# Patient Record
Sex: MALE | Race: BLACK OR AFRICAN AMERICAN | NOT HISPANIC OR LATINO | URBAN - METROPOLITAN AREA
[De-identification: names, ages, dates, MRNs, and addresses within clinical notes are randomized per-mention and may not be internally consistent; named-entity substitution may affect disease eponyms.]

---

## 2021-05-03 DIAGNOSIS — N48.6 PEYRONIE'S DISEASE: Primary | ICD-10-CM

## 2021-05-11 ENCOUNTER — OFFICE VISIT (OUTPATIENT)
Dept: UROLOGY | Facility: CLINIC | Age: 71
End: 2021-05-11
Payer: MEDICARE

## 2021-05-11 VITALS
WEIGHT: 207 LBS | HEIGHT: 77 IN | DIASTOLIC BLOOD PRESSURE: 71 MMHG | RESPIRATION RATE: 16 BRPM | SYSTOLIC BLOOD PRESSURE: 165 MMHG | HEART RATE: 58 BPM | BODY MASS INDEX: 24.44 KG/M2

## 2021-05-11 DIAGNOSIS — N48.6 PEYRONIE'S DISEASE: Primary | ICD-10-CM

## 2021-05-11 PROCEDURE — 1159F MED LIST DOCD IN RCRD: CPT | Mod: S$GLB,,, | Performed by: NURSE PRACTITIONER

## 2021-05-11 PROCEDURE — 3008F PR BODY MASS INDEX (BMI) DOCUMENTED: ICD-10-PCS | Mod: CPTII,S$GLB,, | Performed by: NURSE PRACTITIONER

## 2021-05-11 PROCEDURE — 1126F AMNT PAIN NOTED NONE PRSNT: CPT | Mod: S$GLB,,, | Performed by: NURSE PRACTITIONER

## 2021-05-11 PROCEDURE — 1159F PR MEDICATION LIST DOCUMENTED IN MEDICAL RECORD: ICD-10-PCS | Mod: S$GLB,,, | Performed by: NURSE PRACTITIONER

## 2021-05-11 PROCEDURE — 99204 OFFICE O/P NEW MOD 45 MIN: CPT | Mod: S$GLB,,, | Performed by: NURSE PRACTITIONER

## 2021-05-11 PROCEDURE — 99204 PR OFFICE/OUTPT VISIT, NEW, LEVL IV, 45-59 MIN: ICD-10-PCS | Mod: S$GLB,,, | Performed by: NURSE PRACTITIONER

## 2021-05-11 PROCEDURE — 3008F BODY MASS INDEX DOCD: CPT | Mod: CPTII,S$GLB,, | Performed by: NURSE PRACTITIONER

## 2021-05-11 PROCEDURE — 1126F PR PAIN SEVERITY QUANTIFIED, NO PAIN PRESENT: ICD-10-PCS | Mod: S$GLB,,, | Performed by: NURSE PRACTITIONER

## 2021-05-11 RX ORDER — CLONIDINE HYDROCHLORIDE 0.2 MG/1
0.2 TABLET ORAL 2 TIMES DAILY
COMMUNITY
Start: 2021-04-28

## 2021-05-11 RX ORDER — AMLODIPINE BESYLATE 10 MG/1
10 TABLET ORAL DAILY
COMMUNITY
Start: 2021-04-28

## 2021-05-11 RX ORDER — TRIAMCINOLONE ACETONIDE 1 MG/G
CREAM TOPICAL 2 TIMES DAILY
COMMUNITY

## 2021-05-11 RX ORDER — LISINOPRIL 40 MG/1
40 TABLET ORAL DAILY
COMMUNITY
Start: 2021-04-28

## 2022-04-10 ENCOUNTER — HISTORICAL (OUTPATIENT)
Dept: ADMINISTRATIVE | Facility: HOSPITAL | Age: 72
End: 2022-04-10
Payer: MEDICARE

## 2022-04-26 VITALS
HEIGHT: 77 IN | BODY MASS INDEX: 24.18 KG/M2 | DIASTOLIC BLOOD PRESSURE: 92 MMHG | SYSTOLIC BLOOD PRESSURE: 180 MMHG | WEIGHT: 204.81 LBS

## 2022-06-10 ENCOUNTER — TELEPHONE (OUTPATIENT)
Dept: UROLOGY | Facility: CLINIC | Age: 72
End: 2022-06-10
Payer: MEDICARE

## 2022-06-10 NOTE — TELEPHONE ENCOUNTER
Returned pt's call. Follow appointment was canceled and pt has no upcoming. He states his truck is broke down and cant find anyone to bring him for apt, wants refill of verapamil and if provider agrees let him know so he go pick it up.

## 2022-06-10 NOTE — TELEPHONE ENCOUNTER
----- Message from Jennifer Hyde sent at 6/10/2022  2:25 PM CDT -----  Contact: self  Type:  RX Refill Request    Who Called:  Davi Glaser   Refill or New Rx:  REFILL  RX Name and Strength: verapamil gel    Preferred Pharmacy with phone number:   [facility] - [place], [place] - [address]  [address] [facility] [address]  Phone: [phone] Fax: [phone]     Local or Mail Order: LOCAL  Ordering Provider: RADHA   Would the patient rather a call back or a response via [facility]?  PLEASE CALL BACK   Best Call Back Number: [phone]   Additional Information: N/A

## 2022-06-15 NOTE — TELEPHONE ENCOUNTER
Pt notified appointment needed. Offered one for this afternoon and even merrick. Pt states no to both. Gave next aval.

## 2022-07-21 ENCOUNTER — OFFICE VISIT (OUTPATIENT)
Dept: UROLOGY | Facility: CLINIC | Age: 72
End: 2022-07-21
Payer: MEDICARE

## 2022-07-21 DIAGNOSIS — N48.6 PEYRONIE'S DISEASE: Primary | ICD-10-CM

## 2022-07-21 PROCEDURE — 1160F PR REVIEW ALL MEDS BY PRESCRIBER/CLIN PHARMACIST DOCUMENTED: ICD-10-PCS | Mod: CPTII,S$GLB,, | Performed by: NURSE PRACTITIONER

## 2022-07-21 PROCEDURE — 1160F RVW MEDS BY RX/DR IN RCRD: CPT | Mod: CPTII,S$GLB,, | Performed by: NURSE PRACTITIONER

## 2022-07-21 PROCEDURE — 1159F MED LIST DOCD IN RCRD: CPT | Mod: CPTII,S$GLB,, | Performed by: NURSE PRACTITIONER

## 2022-07-21 PROCEDURE — 99214 OFFICE O/P EST MOD 30 MIN: CPT | Mod: S$GLB,,, | Performed by: NURSE PRACTITIONER

## 2022-07-21 PROCEDURE — 99214 PR OFFICE/OUTPT VISIT, EST, LEVL IV, 30-39 MIN: ICD-10-PCS | Mod: S$GLB,,, | Performed by: NURSE PRACTITIONER

## 2022-07-21 PROCEDURE — 4010F PR ACE/ARB THEARPY RXD/TAKEN: ICD-10-PCS | Mod: CPTII,S$GLB,, | Performed by: NURSE PRACTITIONER

## 2022-07-21 PROCEDURE — 4010F ACE/ARB THERAPY RXD/TAKEN: CPT | Mod: CPTII,S$GLB,, | Performed by: NURSE PRACTITIONER

## 2022-07-21 PROCEDURE — 1159F PR MEDICATION LIST DOCUMENTED IN MEDICAL RECORD: ICD-10-PCS | Mod: CPTII,S$GLB,, | Performed by: NURSE PRACTITIONER

## 2022-11-16 DIAGNOSIS — C49.A4 GIST (GASTROINTESTINAL STROMA TUMOR), MALIGNANT, COLON: Primary | ICD-10-CM

## 2023-01-05 ENCOUNTER — DOCUMENTATION ONLY (OUTPATIENT)
Dept: SURGICAL ONCOLOGY | Facility: CLINIC | Age: 73
End: 2023-01-05

## 2023-01-05 NOTE — PROGRESS NOTES
Patient calls to reschedule appt, he will have a ride next week however Dr. Maravilla is not in.  I offered him an appt, he says he will have to call back to let us know if he will have a ride.

## 2023-05-29 DIAGNOSIS — R14.0 ABDOMINAL DISTENTION: Primary | ICD-10-CM

## 2023-05-29 DIAGNOSIS — R16.0 LIVER MASS: ICD-10-CM

## 2023-05-30 ENCOUNTER — TELEPHONE (OUTPATIENT)
Dept: GASTROENTEROLOGY | Facility: CLINIC | Age: 73
End: 2023-05-30

## 2023-05-30 NOTE — TELEPHONE ENCOUNTER
----- Message from Domitila Colon sent at 5/30/2023 12:26 PM CDT -----  Contact: pt  Pt returing a call from Marleny gan can be reached at [phone].    Thanks,

## 2023-07-13 ENCOUNTER — TELEPHONE (OUTPATIENT)
Dept: GASTROENTEROLOGY | Facility: CLINIC | Age: 73
End: 2023-07-13
Payer: MEDICARE

## 2023-07-13 NOTE — TELEPHONE ENCOUNTER
----- Message from Emily Harris sent at 7/13/2023 12:33 PM CDT -----  Type:  Patient Returning Call    Who Called:Davi Glaser,  Who Left Message for Patient: -  Does the patient know what this is regarding?:scheduling   Would the patient rather a call back or a response via [facility]?    Best Call Back Number:[phone]    Additional Information: returning your call

## 2023-07-14 ENCOUNTER — TELEPHONE (OUTPATIENT)
Dept: HEMATOLOGY/ONCOLOGY | Facility: CLINIC | Age: 73
End: 2023-07-14
Payer: MEDICARE

## 2023-07-14 NOTE — TELEPHONE ENCOUNTER
Spoke to the patient. Appt date, time, and location provided. Requested records from [place] [id number]. [facility]

## 2023-07-24 ENCOUNTER — OFFICE VISIT (OUTPATIENT)
Dept: HEMATOLOGY/ONCOLOGY | Facility: CLINIC | Age: 73
End: 2023-07-24
Payer: MEDICARE

## 2023-07-24 ENCOUNTER — TELEPHONE (OUTPATIENT)
Dept: GASTROENTEROLOGY | Facility: CLINIC | Age: 73
End: 2023-07-24
Payer: MEDICARE

## 2023-07-24 VITALS
RESPIRATION RATE: 16 BRPM | WEIGHT: 223 LBS | SYSTOLIC BLOOD PRESSURE: 149 MMHG | BODY MASS INDEX: 26.33 KG/M2 | HEART RATE: 51 BPM | OXYGEN SATURATION: 98 % | HEIGHT: 77 IN | DIASTOLIC BLOOD PRESSURE: 65 MMHG

## 2023-07-24 DIAGNOSIS — C49.A0 GASTROINTESTINAL STROMAL TUMOR (GIST): Primary | ICD-10-CM

## 2023-07-24 PROCEDURE — 4010F PR ACE/ARB THEARPY RXD/TAKEN: ICD-10-PCS | Mod: CPTII,S$GLB,, | Performed by: INTERNAL MEDICINE

## 2023-07-24 PROCEDURE — 1101F PR PT FALLS ASSESS DOC 0-1 FALLS W/OUT INJ PAST YR: ICD-10-PCS | Mod: CPTII,S$GLB,, | Performed by: INTERNAL MEDICINE

## 2023-07-24 PROCEDURE — 3288F PR FALLS RISK ASSESSMENT DOCUMENTED: ICD-10-PCS | Mod: CPTII,S$GLB,, | Performed by: INTERNAL MEDICINE

## 2023-07-24 PROCEDURE — 99205 PR OFFICE/OUTPT VISIT, NEW, LEVL V, 60-74 MIN: ICD-10-PCS | Mod: S$GLB,,, | Performed by: INTERNAL MEDICINE

## 2023-07-24 PROCEDURE — 3077F SYST BP >= 140 MM HG: CPT | Mod: CPTII,S$GLB,, | Performed by: INTERNAL MEDICINE

## 2023-07-24 PROCEDURE — 3008F PR BODY MASS INDEX (BMI) DOCUMENTED: ICD-10-PCS | Mod: CPTII,S$GLB,, | Performed by: INTERNAL MEDICINE

## 2023-07-24 PROCEDURE — 3078F PR MOST RECENT DIASTOLIC BLOOD PRESSURE < 80 MM HG: ICD-10-PCS | Mod: CPTII,S$GLB,, | Performed by: INTERNAL MEDICINE

## 2023-07-24 PROCEDURE — 99205 OFFICE O/P NEW HI 60 MIN: CPT | Mod: S$GLB,,, | Performed by: INTERNAL MEDICINE

## 2023-07-24 PROCEDURE — 1101F PT FALLS ASSESS-DOCD LE1/YR: CPT | Mod: CPTII,S$GLB,, | Performed by: INTERNAL MEDICINE

## 2023-07-24 PROCEDURE — 3077F PR MOST RECENT SYSTOLIC BLOOD PRESSURE >= 140 MM HG: ICD-10-PCS | Mod: CPTII,S$GLB,, | Performed by: INTERNAL MEDICINE

## 2023-07-24 PROCEDURE — 3008F BODY MASS INDEX DOCD: CPT | Mod: CPTII,S$GLB,, | Performed by: INTERNAL MEDICINE

## 2023-07-24 PROCEDURE — 3288F FALL RISK ASSESSMENT DOCD: CPT | Mod: CPTII,S$GLB,, | Performed by: INTERNAL MEDICINE

## 2023-07-24 PROCEDURE — 1159F MED LIST DOCD IN RCRD: CPT | Mod: CPTII,S$GLB,, | Performed by: INTERNAL MEDICINE

## 2023-07-24 PROCEDURE — 3078F DIAST BP <80 MM HG: CPT | Mod: CPTII,S$GLB,, | Performed by: INTERNAL MEDICINE

## 2023-07-24 PROCEDURE — 1125F PR PAIN SEVERITY QUANTIFIED, PAIN PRESENT: ICD-10-PCS | Mod: CPTII,S$GLB,, | Performed by: INTERNAL MEDICINE

## 2023-07-24 PROCEDURE — 1159F PR MEDICATION LIST DOCUMENTED IN MEDICAL RECORD: ICD-10-PCS | Mod: CPTII,S$GLB,, | Performed by: INTERNAL MEDICINE

## 2023-07-24 PROCEDURE — 4010F ACE/ARB THERAPY RXD/TAKEN: CPT | Mod: CPTII,S$GLB,, | Performed by: INTERNAL MEDICINE

## 2023-07-24 PROCEDURE — 1125F AMNT PAIN NOTED PAIN PRSNT: CPT | Mod: CPTII,S$GLB,, | Performed by: INTERNAL MEDICINE

## 2023-07-24 RX ORDER — CLARITHROMYCIN 500 MG/1
500 TABLET, FILM COATED ORAL 2 TIMES DAILY
COMMUNITY
Start: 2023-05-16

## 2023-07-24 RX ORDER — PANTOPRAZOLE SODIUM 40 MG/1
40 TABLET, DELAYED RELEASE ORAL 2 TIMES DAILY
COMMUNITY
Start: 2023-05-16

## 2023-07-24 RX ORDER — AMOXICILLIN AND CLAVULANATE POTASSIUM 500; 125 MG/1; MG/1
1 TABLET, FILM COATED ORAL 2 TIMES DAILY
COMMUNITY
Start: 2023-05-16

## 2023-07-24 RX ORDER — DICYCLOMINE HYDROCHLORIDE 20 MG/1
TABLET ORAL
COMMUNITY
Start: 2023-06-02

## 2023-07-24 NOTE — TELEPHONE ENCOUNTER
Please review notes from Dr. Short. 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 Would you like to work this patient in? -KG

## 2023-07-24 NOTE — TELEPHONE ENCOUNTER
----- Message from Marleny Banerjee sent at 7/24/2023  4:09 PM CDT -----  Contact: self  Type - New Patient Appointment (Referral)    Patient called in stating Dr. Short sent a referral to get in with Dr. Cat. May I have someone review and reach out to the patient @ [phone] - thanks!

## 2023-07-24 NOTE — PROGRESS NOTES
MEDICAL ONCOLOGY INITIAL CONSULTATION NOTE    Patient ID: Davi Galser is a 72 y.o. male.    Chief Complaint: GIST    HPI:  Patient is a 72-year-old male with past medical history of gastroesophageal reflux disease, hypertension, H pylori infection, hyperlipidemia with past medical history of chronic active gastritis secondary to H pylori infection, reflux esophagitis, tubular adenoma and hyperplastic polyp who recently underwent colonoscopy in November of 2022 which showed a spindle cell lesion with morphologic and immunohistochemical findings consistent with a gastrointestinal stromal tumor along the 2nd portion of the duodenum. Abdominal imaging which included CT scan of the abdomen and pelvis with IV contrast which showed findings as below. I see various cancelled appointments by patient where he was referred to GI and Surg-onc in [place]. He now presents to our clinic today for further evaluation.      Pathology:   08/29/2022    Abdomen/liver:  Negative, no malignant tumor cells identified.  Angioma, benign    Second part of duodenum:   DOG 1 positive   positive    Imaging:   CT scan Abdomen and pelvis with IV contrast: 8/10/22    Impression:   1. 1.8 cm hypervascular duodenal nodule may represent neuroendocrine tumor, GIST or metastatic disease  2. Hypervascular hepatic mass concerning for metastatic disease  3. Colonic diverticulosis without diverticulitis  4. Mild bladder wall thickening  5. Prostatomegaly, 5.2 cm     Past Medical History:   Diagnosis Date    GERD (gastroesophageal reflux disease)     H. pylori infection     HLD (hyperlipidemia)     Hypertension      Family History   Problem Relation Age of Onset    Breast cancer Sister      Social History     Socioeconomic History    Marital status: Unknown   Tobacco Use    Smoking status: Every Day     Packs/day: 0.05     Years: 50.00     Pack years: 2.50     Types: Cigarettes    Smokeless tobacco: Never   Substance and Sexual Activity     Alcohol use: Not Currently    Drug use: Not Currently    Sexual activity: Not Currently     Past Surgical History:   Procedure Laterality Date    APPENDECTOMY           Review of systems:  Review of Systems    Review of Systems   Constitutional: Negative for activity change, appetite change, chills, diaphoresis, fatigue and unexpected weight change.   HENT: Negative for congestion, facial swelling, hearing loss, mouth sores, trouble swallowing and voice change.    Eyes: Negative for photophobia, pain, discharge and itching.   Respiratory: Negative for apnea, cough, choking, chest tightness and shortness of breath.    Cardiovascular: Negative for chest pain, palpitations and leg swelling.   Gastrointestinal: Negative for abdominal distention, abdominal pain, anal bleeding and blood in stool.   Endocrine: Negative for cold intolerance, heat intolerance, polydipsia and polyphagia.   Genitourinary: Negative for difficulty urinating, dysuria, flank pain and hematuria.   Musculoskeletal: Negative for arthralgias, back pain, joint swelling, myalgias, neck pain and neck stiffness.   Skin: Negative for color change, pallor and wound.   Allergic/Immunologic: Negative for environmental allergies, food allergies and immunocompromised state.   Neurological: Negative for dizziness, seizures, facial asymmetry, speech difficulty, light-headedness, numbness and headaches.   Hematological: Negative for adenopathy. Does not bruise/bleed easily.   Psychiatric/Behavioral: Negative for agitation, behavioral problems, confusion, decreased concentration and sleep disturbance.       Physical Exam  Vitals and nursing note reviewed.   Constitutional:       General: He is not in acute distress.     Appearance: Normal appearance. He is not ill-appearing.   HENT:      Head: Normocephalic and atraumatic.      Nose: No congestion or rhinorrhea.   Eyes:      General: No scleral icterus.     Extraocular Movements: Extraocular movements intact.       Pupils: Pupils are equal, round, and reactive to light.   Cardiovascular:      Rate and Rhythm: Normal rate and regular rhythm.      Pulses: Normal pulses.      Heart sounds: Normal heart sounds. No murmur heard.    No gallop.   Pulmonary:      Effort: Pulmonary effort is normal. No respiratory distress.      Breath sounds: Normal breath sounds. No stridor. No wheezing or rhonchi.   Abdominal:      General: Bowel sounds are normal. There is no distension.      Palpations: There is no mass.      Tenderness: There is no abdominal tenderness. There is no guarding.   Musculoskeletal:         General: No swelling, tenderness, deformity or signs of injury. Normal range of motion.      Cervical back: Normal range of motion and neck supple. No rigidity. No muscular tenderness.      Right lower leg: No edema.      Left lower leg: No edema.   Skin:     General: Skin is warm.      Coloration: Skin is not jaundiced or pale.      Findings: No bruising or lesion.   Neurological:      General: No focal deficit present.      Mental Status: He is alert and oriented to person, place, and time.      Cranial Nerves: No cranial nerve deficit.      Sensory: No sensory deficit.      Motor: No weakness.      Gait: Gait normal.   Psychiatric:         Mood and Affect: Mood normal.         Behavior: Behavior normal.         Thought Content: Thought content normal.                                   Vitals:    07/24/23 1307   BP: (!) 149/65   Pulse: (!) 51   Resp: 16   Body surface area is 2.34 meters squared.    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 Maravilla at this time.  I also see a missed appointment by gastroenterology.  Discussed with him guidelines for small gist less than 2 cm with no high-risk features and the need to do periodic endoscopic  surveillance.  I hence would recommend he follows up again with GI at this time.  Do not have any new recommendations at this time but he needs to follow-up with periodic endoscopy ultrasounds.  Patient voices understanding and will contact GI        Plan:  Follow-up with GI/ surgical oncology    Return to clinic in 6 months for MD visit.  No labs prior    Total time spent in counseling and discussion about further management options including relevant lab work, treatment,  prognosis, medications and intended side effects was more than 60 minutes. More than 50% of the time was spent on counseling and coordination of care.  I spent a total of 60 minutes on the day of the visit.This includes face to face time and non-face to face time preparing to see the patient (eg, review of tests), Obtaining and/or reviewing separately obtained history, Documenting clinical information in the electronic or other health record, Independently interpreting resultsand communicating results to the patient/family/caregiver, or Care coordination.

## 2023-07-25 ENCOUNTER — TELEPHONE (OUTPATIENT)
Dept: HEMATOLOGY/ONCOLOGY | Facility: CLINIC | Age: 73
End: 2023-07-25
Payer: MEDICARE

## 2023-07-27 ENCOUNTER — TELEPHONE (OUTPATIENT)
Dept: HEMATOLOGY/ONCOLOGY | Facility: CLINIC | Age: 73
End: 2023-07-27
Payer: MEDICARE

## 2023-07-27 NOTE — TELEPHONE ENCOUNTER
Faxed Dr Short office note to Dr Beach and Dr Schulz at [facility] with a note for them to reach out to patient to schedule a follow up and refer back to [facility] or [facility] for EUS

## 2023-07-27 NOTE — TELEPHONE ENCOUNTER
Spoke with Glenna at Dr Vance's office about status of EUS. She states they received a ref from GI for patient back in Sept 2022 and scheduled an EUS at that time but patient no showed. Patient was told to follow up with Dr Schulz for further instruction. [facility] office spoke to [facility] office and said that if patient needed an EUS, they could send another referral when it was needed but no other referral has been sent at this time. Will fax office note from Dr. Short to [facility] and [facility]'s offices as I am unsure who is his primary GI provider at this time.

## 2023-08-03 ENCOUNTER — TELEPHONE (OUTPATIENT)
Dept: HEMATOLOGY/ONCOLOGY | Facility: CLINIC | Age: 73
End: 2023-08-03
Payer: MEDICARE

## 2023-08-03 NOTE — TELEPHONE ENCOUNTER
Returned call, left message to return call to this clinic        ----- Message from Domitila Colon sent at 8/3/2023 10:57 AM CDT -----  Grace mckeon/Dr Beach calling about fax about pt and she can be reached at [phone].    Thanks,

## 2023-08-04 ENCOUNTER — TELEPHONE (OUTPATIENT)
Dept: HEMATOLOGY/ONCOLOGY | Facility: CLINIC | Age: 73
End: 2023-08-04
Payer: MEDICARE

## 2023-08-04 NOTE — TELEPHONE ENCOUNTER
----- Message from Robyn Farley sent at 8/3/2023  4:18 PM CDT -----  Contact: [facility]  Type:  Patient Returning Call    Who Called: [facility]  Who Left Message for Patient: nurse   Does the patient know what this is regarding?: rtn nurse call   Would the patient rather a call back or a response via [facility]?  phone  Best Call Back Number: [phone]  Additional Information:

## 2023-08-07 ENCOUNTER — TELEPHONE (OUTPATIENT)
Dept: HEMATOLOGY/ONCOLOGY | Facility: CLINIC | Age: 73
End: 2023-08-07
Payer: MEDICARE

## 2023-08-07 NOTE — TELEPHONE ENCOUNTER
Faxed referral to Dr. Lenin Vance's office          ----- Message from Domitila Colon sent at 8/3/2023 10:57 AM CDT -----  Grace mckeon/Dr Beach calling about fax about pt and she can be reached at [phone].    Thanks,

## 2023-08-10 ENCOUNTER — TELEPHONE (OUTPATIENT)
Dept: GASTROENTEROLOGY | Facility: CLINIC | Age: 73
End: 2023-08-10
Payer: MEDICARE

## 2023-08-10 NOTE — TELEPHONE ENCOUNTER
----- Message from Noa Pascual LPN sent at 8/10/2023  2:24 PM CDT -----    ----- Message -----  From: Noa Pascual LPN  Sent: 8/10/2023   2:09 PM CDT  To: Alisha Hercules      ----- Message -----  From: Anna Benson  Sent: 8/10/2023   1:56 PM CDT  To: [facility] Staff    Patient is calling in regards to still waiting to be scheduled for procedure...please call him back at [phone].                        Thanks  [facility]

## 2023-08-10 NOTE — TELEPHONE ENCOUNTER
Returned call to pt. No answer. LVM. Pt needs to call Dr Short's office to find out the information he is inquiring about.

## 2023-08-11 ENCOUNTER — TELEPHONE (OUTPATIENT)
Dept: HEMATOLOGY/ONCOLOGY | Facility: CLINIC | Age: 73
End: 2023-08-11
Payer: MEDICARE

## 2023-08-11 NOTE — TELEPHONE ENCOUNTER
Patient states he is unable to go to Dr Orellana office. States he has no transportation. Would like to see someone locally. Offered to assist with gas cards but pt declined. Message sent to Dr Short and staff. TTRN   High Suspicion of COVID-19 Yes

## 2023-08-11 NOTE — TELEPHONE ENCOUNTER
Spoke to the patient discussed the importance of keeping appt with Dr Maravilla. Understands there isn't anyone locally. Patient appreciative and will reach out to schedule. TTRN

## 2023-08-11 NOTE — TELEPHONE ENCOUNTER
Returned patients telephone call. Patient did not answer. Left a VM requesting he call the office. KELLY

## 2023-08-13 NOTE — TELEPHONE ENCOUNTER
I reviewed the GI records in [facility] with Dr. Short.  I agree with tertiary center referral directly rather than to me (general GI) as I cannot offer more than his current local GI providers ([facility] and [facility]).    The patient seems to be contacting my clinic directly. Will send these notes to Dr. Short (and his staff) to update patient on which GI providers to contact (likely [facility] in [place]).  NBP

## 2023-08-14 ENCOUNTER — TELEPHONE (OUTPATIENT)
Dept: HEMATOLOGY/ONCOLOGY | Facility: CLINIC | Age: 73
End: 2023-08-14
Payer: MEDICARE

## 2023-08-14 DIAGNOSIS — C49.A0 GASTROINTESTINAL STROMAL TUMOR (GIST): Primary | ICD-10-CM

## 2023-08-14 NOTE — TELEPHONE ENCOUNTER
Rec'd call from Fátima @ Dr. Vance's office, she states this pt has several no-shows and his account has been turned over to a collection agency. Dr. Vance will not see patient and has referred him to Dr. Lenin Beach ([facility]) Spoke with patient and he verbalized understanding. Will send referrals out , pt needs EUS.

## 2023-08-24 ENCOUNTER — TELEPHONE (OUTPATIENT)
Dept: HEMATOLOGY/ONCOLOGY | Facility: CLINIC | Age: 73
End: 2023-08-24
Payer: MEDICARE

## 2023-08-24 NOTE — TELEPHONE ENCOUNTER
"Spoke with patient, offered solutions as in calling Dr. Beach's office (states he saw Dr. Beach before.) States" I might call him." Reports that he saw a MD and was prescribed and antibiotic, states he is feeling better. Advised if abdomen continues to swell, please go to the nearest ER, states "they don't help me when I go" Patient hung up on me.            ----- Message from Jennifer Hyde sent at 8/23/2023  9:05 AM CDT -----  Contact: self  Patient is calling this morning trying to get gastro referral straightened out. He contacted Dr Lenin Beach and was advised they don't take his [employer] insurance. Patient states his insurance then told him to contact Dr Cat, but looking at his appointment history his appointment with [facility] was canceled due to "Other (Dr Short & Dr Cat agreed patient needs tertiary care. Dr Short is having his nurse contact patient and explain. Cancel referral.)". Patient's stomach is getting worse and swelling up real bad he says. Please call back at [phone].    "

## 2023-08-30 ENCOUNTER — TELEPHONE (OUTPATIENT)
Dept: HEMATOLOGY/ONCOLOGY | Facility: CLINIC | Age: 73
End: 2023-08-30
Payer: MEDICARE

## 2023-08-30 NOTE — TELEPHONE ENCOUNTER
----- Message from Reta Zhou sent at 8/30/2023 10:10 AM CDT -----  Contact: Davi Tomlinson needs a call back at [phone], Regards to getting another referral sent to Dr. Dav Maravilla office he missed the appointment due to transportation needs and need to get  another referral  in order to be reschedule to see him.     Thanks  Td

## 2023-08-30 NOTE — TELEPHONE ENCOUNTER
Dr. Maravilla has denied the referral due to Patient not showing up to appointments. 8/30/2023 4:47 pm

## 2024-01-18 ENCOUNTER — TELEPHONE (OUTPATIENT)
Dept: HEMATOLOGY/ONCOLOGY | Facility: CLINIC | Age: 74
End: 2024-01-18
Payer: MEDICARE

## 2024-01-18 NOTE — TELEPHONE ENCOUNTER
Try to return Patients phone call and had to leave voicemail to call office back.          ----- Message from Kalyn Rockwell sent at 1/18/2024 11:45 AM CST -----  Contact: self  Type:  Patient Returning Call    Who Called:Davi Glaser  Who Left Message for Patient:unsure  Does the patient know what this is regarding?:upcoming appt  Would the patient rather a call back or a response via [facility]?   Best Call Back Number:[phone]  Additional Information: n/a

## 2024-01-22 ENCOUNTER — TELEPHONE (OUTPATIENT)
Dept: HEMATOLOGY/ONCOLOGY | Facility: CLINIC | Age: 74
End: 2024-01-22
Payer: MEDICARE

## 2024-01-22 NOTE — TELEPHONE ENCOUNTER
LVMx1 for pt to return call to clinic to discuss this situation.         ----- Message from Dre Short MD sent at 1/22/2024  9:34 AM CST -----  Regarding: RE: Return Call  Contact: patient  This has been discussed several times with the patient by myself and local GI here as well.   I am not sure why he is confused.  He does not have any other option apart from [facility]  ----- Message -----  From: Paula Melendez RN  Sent: 1/18/2024   2:24 PM CST  To: Dre Short MD; Ashly Varghese MA  Subject: RE: Return Call                                  I do not believe we have a local provider that can see him for what he has.  ----- Message -----  From: Ashly Varghese MA  Sent: 1/18/2024   2:09 PM CST  To: Paula Melendez RN  Subject: RE: Return Call                                  I just called him again and he would want to see someone local before he drives to [place].   ----- Message -----  From: Paula Melendez RN  Sent: 1/18/2024   1:59 PM CST  To: Ashly Varghese MA  Subject: RE: Return Call                                  We can offer a gas card to him if that would be helpful. Medical transportation is also available through most insurance companies and through local companies.   ----- Message -----  From: Ashly Varghese MA  Sent: 1/18/2024   1:50 PM CST  To: Paula Melendez RN  Subject: FW: Return Call                                  Just spoke with pt and he is confused. I see in his appt notes he needs to see [facility]. I asked him if he seen [facility] and he said he has no way to get to [place]. Please advise. What should I do moving forward?  ----- Message -----  From: Ting Ferris  Sent: 1/18/2024  12:26 PM CST  To: Han Erickson Staff  Subject: Return Call                                      Type:  Patient Returning Call    Who Called:Davi   Who Left Message for Patient: Antonina  Does the patient know what this is regarding?: no   Would the patient rather a call back or a response  via [facility]?  Call back   Best Call Back Number: [phone] (home)     Additional Information:     Thanks,  SJ

## 2024-01-27 ENCOUNTER — OUTSIDE PLACE OF SERVICE (OUTPATIENT)
Dept: GASTROENTEROLOGY | Facility: CLINIC | Age: 74
End: 2024-01-27
Payer: MEDICARE

## 2024-01-27 PROCEDURE — 99223 1ST HOSP IP/OBS HIGH 75: CPT | Mod: ,,, | Performed by: INTERNAL MEDICINE

## 2024-01-28 ENCOUNTER — OUTSIDE PLACE OF SERVICE (OUTPATIENT)
Dept: GASTROENTEROLOGY | Facility: CLINIC | Age: 74
End: 2024-01-28
Payer: MEDICARE

## 2024-01-28 ENCOUNTER — TELEPHONE (OUTPATIENT)
Dept: GASTROENTEROLOGY | Facility: CLINIC | Age: 74
End: 2024-01-28

## 2024-01-28 PROCEDURE — 44799 UNLISTED PX SMALL INTESTINE: CPT | Mod: ,,, | Performed by: INTERNAL MEDICINE

## 2024-01-29 NOTE — TELEPHONE ENCOUNTER
----- Message from Ting Ferris sent at 1/29/2024 12:11 PM CST -----  Regarding: Appointment  Contact: [facility]  Per phone call with Ramona, she stated that someone had called the patient on 01/10/2024 and advised him he had an appointment on 02/09/2024 and he was calling to confirm this appointment so he can get his transportation.  Please return call to Ramona at [phone] ext [phone]. Please return call at [phone] for the patient.    Thanks,  SJ

## 2024-01-29 NOTE — TELEPHONE ENCOUNTER
Pulled records from [facility] and attached to encounter. I do not see an appointment for 02/09/24, nor where anyone has tried contacting this patient. Please advise. -KG

## 2024-01-29 NOTE — TELEPHONE ENCOUNTER
Correct. No appointment. He is established with Dr. Beach and was referred to Dr. Vance as well. He may be scheduled with one of them on this date.  Kn

## 2024-01-29 NOTE — TELEPHONE ENCOUNTER
----- Message from Anna Benson sent at 1/29/2024 12:03 PM CST -----   Ramona from [facility] is calling to have a call back in regards to patient schedule please call her back at [phone]. Ext [phone].            Thanks  [facility]

## 2024-01-29 NOTE — TELEPHONE ENCOUNTER
----- Message from Ankita Silvestre sent at 1/29/2024 11:43 AM CST -----  Contact: self  Patient is requesting a call back regarding hospital follow up/scope. Please call back @ [phone] (home). Patient stated he was seen in the emergency room 01/23/2024-01/28/2024      Patient stated a referral should have been sent and appointment was scheduled 02/05/2024

## 2024-01-30 ENCOUNTER — TELEPHONE (OUTPATIENT)
Dept: GASTROENTEROLOGY | Facility: CLINIC | Age: 74
End: 2024-01-30
Payer: MEDICARE

## 2024-01-30 DIAGNOSIS — R93.89 ABNORMAL CT SCAN: Primary | ICD-10-CM

## 2024-01-30 DIAGNOSIS — D21.4: ICD-10-CM

## 2024-01-30 DIAGNOSIS — R10.9 ABDOMINAL PAIN: ICD-10-CM

## 2024-01-30 DIAGNOSIS — R14.0 BLOATING: ICD-10-CM

## 2024-01-30 NOTE — TELEPHONE ENCOUNTER
----- Message from Larissa Krishna MA sent at 1/29/2024  3:00 PM CST -----  Regarding: FW: Referral  Contact: [facility]    ----- Message -----  From: Ting Ferris  Sent: 1/29/2024   2:56 PM CST  To: [facility] Staff  Subject: Referral                                         Per phone call with Ramona, she stated that a referral was faxed over on 01/10/2024 to have the patient to be schedule for an appointment.  The referral is coming from Dr Sousa at [facility].  Please return call at [phone].    Thanks,  SJ

## 2024-01-30 NOTE — TELEPHONE ENCOUNTER
Correct, patient needs to re-establish with Dr. Beach.  I do not perform  endoscopic ultrasounds.  I also previously reviewed this case with Dr. Short with the same recommendations in 8/2023.  NBP

## 2024-02-09 DIAGNOSIS — R10.84 ABDOMINAL PAIN, GENERALIZED: Primary | ICD-10-CM

## 2024-02-09 DIAGNOSIS — R14.0 ABDOMINAL BLOATING: ICD-10-CM

## 2024-02-09 DIAGNOSIS — D21.4: ICD-10-CM

## 2024-02-10 ENCOUNTER — TELEPHONE (OUTPATIENT)
Dept: GASTROENTEROLOGY | Facility: CLINIC | Age: 74
End: 2024-02-10
Payer: MEDICARE

## 2024-02-10 NOTE — TELEPHONE ENCOUNTER
GBx wnl w/o Hp.    Notify patient that his stomach Bx were benign without infection.  He should arrange follow up with his out-patient gastroenterologist (Dr. Beach).  NBP

## 2024-02-13 NOTE — TELEPHONE ENCOUNTER
Called and spoke with patient to give him his results and he understood , he also stated that he see's his PCP on Monday and will get her to send a referral to see another GI due to the fact that [facility] does not take his insurance.  keturah

## 2024-07-18 NOTE — PROGRESS NOTES
Subjective:       Patient ID: Davi Glaser is a 71 y.o. male.    Chief Complaint: No chief complaint on file.      HPI: 71-year-old male known service yearly follow-up history Peyronie's.  He is on topical verapamil cream apply to the affected area b.i.d. as well as topical vitamin-E E again twice a day to the affected area.  He states that works well for him.  He is out of medication.  A curvature of the penis is much less.  He has no pain with erections or intercourse.  Denies any voiding complaints.  States he has had no urinary tract infections as we seen him over the last year.  He states his stream is easy start of good caliber.  Denies dysuria, frequency, urgency, incontinence or gross hematuria.  No flank pain.  PSA/OCTAVIANO followed by PCP       Past Medical History:   Past Medical History:   Diagnosis Date    Hypertension        Past Surgical Historical:   Past Surgical History:   Procedure Laterality Date    APPENDECTOMY          Medications:   Medication List with Changes/Refills   Current Medications    AMLODIPINE (NORVASC) 10 MG TABLET    Take 10 mg by mouth once daily.    CLONIDINE (CATAPRES) 0.2 MG TABLET    Take 0.2 mg by mouth 2 (two) times daily.    LISINOPRIL (PRINIVIL,ZESTRIL) 40 MG TABLET    Take 40 mg by mouth once daily.    TRIAMCINOLONE ACETONIDE 0.1% (KENALOG) 0.1 % CREAM    Apply topically 2 (two) times daily.        Past Social History:   Social History     Socioeconomic History    Marital status: Unknown   Tobacco Use    Smoking status: Current Every Day Smoker     Packs/day: 0.05     Years: 50.00     Pack years: 2.50     Types: Cigarettes    Smokeless tobacco: Never Used   Substance and Sexual Activity    Alcohol use: Not Currently    Drug use: Not Currently    Sexual activity: Not Currently       Allergies:   Review of patient's allergies indicates:   Allergen Reactions    Ibuprofen Nausea Only        Family History: History reviewed. No pertinent family history.     Review of  Systems:  Review of Systems   Constitutional: Negative for activity change and appetite change.   HENT: Negative for congestion and dental problem.    Eyes: Negative for visual disturbance.   Respiratory: Negative for chest tightness and shortness of breath.    Cardiovascular: Negative for chest pain.   Gastrointestinal: Negative for abdominal distention and abdominal pain.   Genitourinary: Negative for decreased urine volume, difficulty urinating, dysuria, enuresis, flank pain, frequency, genital sores, hematuria, penile discharge, penile pain, penile swelling, scrotal swelling, testicular pain and urgency.   Musculoskeletal: Negative for back pain and neck pain.   Skin: Negative for color change.   Neurological: Negative for dizziness.   Hematological: Negative for adenopathy.   Psychiatric/Behavioral: Negative for agitation, behavioral problems and confusion.       Physical Exam:  Physical Exam  Constitutional:       Appearance: He is well-developed.   HENT:      Head: Normocephalic.   Eyes:      General: No scleral icterus.  Pulmonary:      Effort: Pulmonary effort is normal.      Breath sounds: Normal breath sounds.   Abdominal:      General: There is no distension.      Palpations: Abdomen is soft.      Tenderness: There is no abdominal tenderness.      Hernia: No hernia is present. There is no hernia in the right inguinal area or left inguinal area.   Genitourinary:     Penis: Normal.       Testes: Normal. Cremasteric reflex is present.          Comments: Palpable dorsal plaque  Musculoskeletal:      Cervical back: Normal range of motion.   Skin:     General: Skin is warm and dry.   Neurological:      Mental Status: He is alert and oriented to person, place, and time.         Assessment/Plan:   Peyronie's--patient reports good response to topical cream, written prescription for verapamil topical cream b.i.d. to affected area with refills as well as over-the-counter vitamin-E.  Problem List Items Addressed  This Visit    None               labor at term

## 2025-03-04 ENCOUNTER — HOSPITAL ENCOUNTER (INPATIENT)
Facility: HOSPITAL | Age: 75
LOS: 3 days | Discharge: HOME | DRG: 391 | End: 2025-03-07
Attending: INTERNAL MEDICINE | Admitting: INTERNAL MEDICINE
Payer: MEDICARE

## 2025-03-04 DIAGNOSIS — K31.5 DUODENAL OBSTRUCTION: ICD-10-CM

## 2025-03-04 DIAGNOSIS — R13.10 DYSPHAGIA, UNSPECIFIED TYPE: Primary | ICD-10-CM

## 2025-03-04 DIAGNOSIS — R07.9 CHEST PAIN: ICD-10-CM

## 2025-03-04 LAB
ALBUMIN SERPL-MCNC: 4.2 G/DL (ref 3.4–4.8)
ALBUMIN/GLOB SERPL: 1.1 RATIO (ref 1.1–2)
ALP SERPL-CCNC: 89 UNIT/L (ref 40–150)
ALT SERPL-CCNC: 28 UNIT/L (ref 0–55)
ANION GAP SERPL CALC-SCNC: 8 MEQ/L
AST SERPL-CCNC: 32 UNIT/L (ref 5–34)
BASOPHILS # BLD AUTO: 0.05 X10(3)/MCL
BASOPHILS NFR BLD AUTO: 0.7 %
BILIRUB SERPL-MCNC: 1 MG/DL
BUN SERPL-MCNC: 8.2 MG/DL (ref 8.4–25.7)
CALCIUM SERPL-MCNC: 10.6 MG/DL (ref 8.8–10)
CHLORIDE SERPL-SCNC: 106 MMOL/L (ref 98–107)
CO2 SERPL-SCNC: 25 MMOL/L (ref 23–31)
CREAT SERPL-MCNC: 1.42 MG/DL (ref 0.72–1.25)
CREAT/UREA NIT SERPL: 6
EOSINOPHIL # BLD AUTO: 0.41 X10(3)/MCL (ref 0–0.9)
EOSINOPHIL NFR BLD AUTO: 6.1 %
ERYTHROCYTE [DISTWIDTH] IN BLOOD BY AUTOMATED COUNT: 16.3 % (ref 11.5–17)
GFR SERPLBLD CREATININE-BSD FMLA CKD-EPI: 52 ML/MIN/1.73/M2
GLOBULIN SER-MCNC: 3.8 GM/DL (ref 2.4–3.5)
GLUCOSE SERPL-MCNC: 99 MG/DL (ref 82–115)
HCT VFR BLD AUTO: 44.1 % (ref 42–52)
HGB BLD-MCNC: 14.9 G/DL (ref 14–18)
IMM GRANULOCYTES # BLD AUTO: 0.01 X10(3)/MCL (ref 0–0.04)
IMM GRANULOCYTES NFR BLD AUTO: 0.1 %
LYMPHOCYTES # BLD AUTO: 2.6 X10(3)/MCL (ref 0.6–4.6)
LYMPHOCYTES NFR BLD AUTO: 38.7 %
MAGNESIUM SERPL-MCNC: 2.1 MG/DL (ref 1.6–2.6)
MCH RBC QN AUTO: 27.4 PG (ref 27–31)
MCHC RBC AUTO-ENTMCNC: 33.8 G/DL (ref 33–36)
MCV RBC AUTO: 81.2 FL (ref 80–94)
MONOCYTES # BLD AUTO: 0.52 X10(3)/MCL (ref 0.1–1.3)
MONOCYTES NFR BLD AUTO: 7.7 %
NEUTROPHILS # BLD AUTO: 3.12 X10(3)/MCL (ref 2.1–9.2)
NEUTROPHILS NFR BLD AUTO: 46.7 %
NRBC BLD AUTO-RTO: 0 %
PLATELET # BLD AUTO: 347 X10(3)/MCL (ref 130–400)
PMV BLD AUTO: 10.7 FL (ref 7.4–10.4)
POTASSIUM SERPL-SCNC: 3.6 MMOL/L (ref 3.5–5.1)
PROT SERPL-MCNC: 8 GM/DL (ref 5.8–7.6)
RBC # BLD AUTO: 5.43 X10(6)/MCL (ref 4.7–6.1)
SODIUM SERPL-SCNC: 139 MMOL/L (ref 136–145)
WBC # BLD AUTO: 6.71 X10(3)/MCL (ref 4.5–11.5)

## 2025-03-04 PROCEDURE — 63600175 PHARM REV CODE 636 W HCPCS: Performed by: NURSE PRACTITIONER

## 2025-03-04 PROCEDURE — 83735 ASSAY OF MAGNESIUM: CPT | Performed by: NURSE PRACTITIONER

## 2025-03-04 PROCEDURE — 80053 COMPREHEN METABOLIC PANEL: CPT | Performed by: NURSE PRACTITIONER

## 2025-03-04 PROCEDURE — 85025 COMPLETE CBC W/AUTO DIFF WBC: CPT | Performed by: NURSE PRACTITIONER

## 2025-03-04 PROCEDURE — 11000001 HC ACUTE MED/SURG PRIVATE ROOM

## 2025-03-04 PROCEDURE — 36415 COLL VENOUS BLD VENIPUNCTURE: CPT | Performed by: NURSE PRACTITIONER

## 2025-03-04 PROCEDURE — 25000003 PHARM REV CODE 250: Performed by: NURSE PRACTITIONER

## 2025-03-04 RX ORDER — ISOSORBIDE MONONITRATE 30 MG/1
30 TABLET, EXTENDED RELEASE ORAL DAILY
Status: DISCONTINUED | OUTPATIENT
Start: 2025-03-05 | End: 2025-03-07 | Stop reason: HOSPADM

## 2025-03-04 RX ORDER — ALUMINUM HYDROXIDE, MAGNESIUM HYDROXIDE, AND SIMETHICONE 1200; 120; 1200 MG/30ML; MG/30ML; MG/30ML
30 SUSPENSION ORAL 4 TIMES DAILY PRN
Status: DISCONTINUED | OUTPATIENT
Start: 2025-03-04 | End: 2025-03-07 | Stop reason: HOSPADM

## 2025-03-04 RX ORDER — ONDANSETRON HYDROCHLORIDE 2 MG/ML
4 INJECTION, SOLUTION INTRAVENOUS EVERY 4 HOURS PRN
Status: DISCONTINUED | OUTPATIENT
Start: 2025-03-04 | End: 2025-03-07 | Stop reason: HOSPADM

## 2025-03-04 RX ORDER — LISINOPRIL 20 MG/1
40 TABLET ORAL DAILY
Status: DISCONTINUED | OUTPATIENT
Start: 2025-03-05 | End: 2025-03-07 | Stop reason: HOSPADM

## 2025-03-04 RX ORDER — MUPIROCIN 20 MG/G
OINTMENT TOPICAL 2 TIMES DAILY
Status: DISCONTINUED | OUTPATIENT
Start: 2025-03-05 | End: 2025-03-07 | Stop reason: HOSPADM

## 2025-03-04 RX ORDER — FUROSEMIDE 20 MG/1
20 TABLET ORAL DAILY
Status: DISCONTINUED | OUTPATIENT
Start: 2025-03-05 | End: 2025-03-07 | Stop reason: HOSPADM

## 2025-03-04 RX ORDER — IBUPROFEN 200 MG
16 TABLET ORAL
Status: DISCONTINUED | OUTPATIENT
Start: 2025-03-04 | End: 2025-03-07 | Stop reason: HOSPADM

## 2025-03-04 RX ORDER — FUROSEMIDE 20 MG/1
20 TABLET ORAL DAILY
COMMUNITY

## 2025-03-04 RX ORDER — SODIUM CHLORIDE 0.9 % (FLUSH) 0.9 %
10 SYRINGE (ML) INJECTION
Status: DISCONTINUED | OUTPATIENT
Start: 2025-03-04 | End: 2025-03-07 | Stop reason: HOSPADM

## 2025-03-04 RX ORDER — IRBESARTAN 150 MG/1
150 TABLET ORAL DAILY
Status: ON HOLD | COMMUNITY
End: 2025-03-07 | Stop reason: HOSPADM

## 2025-03-04 RX ORDER — ATORVASTATIN CALCIUM 40 MG/1
40 TABLET, FILM COATED ORAL DAILY
Status: ON HOLD | COMMUNITY
End: 2025-03-04 | Stop reason: CLARIF

## 2025-03-04 RX ORDER — AMLODIPINE BESYLATE 5 MG/1
10 TABLET ORAL DAILY
Status: DISCONTINUED | OUTPATIENT
Start: 2025-03-05 | End: 2025-03-07 | Stop reason: HOSPADM

## 2025-03-04 RX ORDER — PROCHLORPERAZINE EDISYLATE 5 MG/ML
5 INJECTION INTRAMUSCULAR; INTRAVENOUS EVERY 6 HOURS PRN
Status: DISCONTINUED | OUTPATIENT
Start: 2025-03-04 | End: 2025-03-07 | Stop reason: HOSPADM

## 2025-03-04 RX ORDER — HYDRALAZINE HYDROCHLORIDE 20 MG/ML
10 INJECTION INTRAMUSCULAR; INTRAVENOUS EVERY 4 HOURS PRN
Status: DISCONTINUED | OUTPATIENT
Start: 2025-03-04 | End: 2025-03-07 | Stop reason: HOSPADM

## 2025-03-04 RX ORDER — ENOXAPARIN SODIUM 100 MG/ML
40 INJECTION SUBCUTANEOUS EVERY 24 HOURS
Status: DISCONTINUED | OUTPATIENT
Start: 2025-03-05 | End: 2025-03-07 | Stop reason: HOSPADM

## 2025-03-04 RX ORDER — PANTOPRAZOLE SODIUM 40 MG/1
40 TABLET, DELAYED RELEASE ORAL 2 TIMES DAILY
Status: DISCONTINUED | OUTPATIENT
Start: 2025-03-04 | End: 2025-03-07 | Stop reason: HOSPADM

## 2025-03-04 RX ORDER — AMOXICILLIN 250 MG
1 CAPSULE ORAL 2 TIMES DAILY PRN
Status: DISCONTINUED | OUTPATIENT
Start: 2025-03-04 | End: 2025-03-07 | Stop reason: HOSPADM

## 2025-03-04 RX ORDER — BISACODYL 10 MG/1
10 SUPPOSITORY RECTAL DAILY PRN
Status: DISCONTINUED | OUTPATIENT
Start: 2025-03-04 | End: 2025-03-07 | Stop reason: HOSPADM

## 2025-03-04 RX ORDER — ISOSORBIDE MONONITRATE 30 MG/1
30 TABLET, EXTENDED RELEASE ORAL DAILY
Status: ON HOLD | COMMUNITY
End: 2025-03-15

## 2025-03-04 RX ORDER — ACETAMINOPHEN 500 MG
1000 TABLET ORAL EVERY 6 HOURS PRN
Status: DISCONTINUED | OUTPATIENT
Start: 2025-03-04 | End: 2025-03-07 | Stop reason: HOSPADM

## 2025-03-04 RX ORDER — IBUPROFEN 200 MG
24 TABLET ORAL
Status: DISCONTINUED | OUTPATIENT
Start: 2025-03-04 | End: 2025-03-07 | Stop reason: HOSPADM

## 2025-03-04 RX ORDER — GLUCAGON 1 MG
1 KIT INJECTION
Status: DISCONTINUED | OUTPATIENT
Start: 2025-03-04 | End: 2025-03-07 | Stop reason: HOSPADM

## 2025-03-04 RX ORDER — NALOXONE HCL 0.4 MG/ML
0.02 VIAL (ML) INJECTION
Status: DISCONTINUED | OUTPATIENT
Start: 2025-03-04 | End: 2025-03-07 | Stop reason: HOSPADM

## 2025-03-04 RX ORDER — TALC
6 POWDER (GRAM) TOPICAL NIGHTLY PRN
Status: DISCONTINUED | OUTPATIENT
Start: 2025-03-04 | End: 2025-03-07 | Stop reason: HOSPADM

## 2025-03-04 RX ADMIN — PANTOPRAZOLE SODIUM 40 MG: 40 TABLET, DELAYED RELEASE ORAL at 08:03

## 2025-03-04 RX ADMIN — ACETAMINOPHEN 1000 MG: 500 TABLET ORAL at 08:03

## 2025-03-04 RX ADMIN — HYDRALAZINE HYDROCHLORIDE 10 MG: 20 INJECTION INTRAMUSCULAR; INTRAVENOUS at 11:03

## 2025-03-04 RX ADMIN — Medication 6 MG: at 08:03

## 2025-03-05 LAB
ALBUMIN SERPL-MCNC: 4 G/DL (ref 3.4–4.8)
ALBUMIN/GLOB SERPL: 1.1 RATIO (ref 1.1–2)
ALP SERPL-CCNC: 87 UNIT/L (ref 40–150)
ALT SERPL-CCNC: 34 UNIT/L (ref 0–55)
ANION GAP SERPL CALC-SCNC: 10 MEQ/L
AST SERPL-CCNC: 34 UNIT/L (ref 5–34)
BILIRUB SERPL-MCNC: 1 MG/DL
BUN SERPL-MCNC: 8.6 MG/DL (ref 8.4–25.7)
CALCIUM SERPL-MCNC: 10.3 MG/DL (ref 8.8–10)
CHLORIDE SERPL-SCNC: 105 MMOL/L (ref 98–107)
CO2 SERPL-SCNC: 26 MMOL/L (ref 23–31)
CREAT SERPL-MCNC: 1.4 MG/DL (ref 0.72–1.25)
CREAT/UREA NIT SERPL: 6
GFR SERPLBLD CREATININE-BSD FMLA CKD-EPI: 52 ML/MIN/1.73/M2
GLOBULIN SER-MCNC: 3.6 GM/DL (ref 2.4–3.5)
GLUCOSE SERPL-MCNC: 105 MG/DL (ref 82–115)
MAGNESIUM SERPL-MCNC: 2 MG/DL (ref 1.6–2.6)
POTASSIUM SERPL-SCNC: 3.6 MMOL/L (ref 3.5–5.1)
PROT SERPL-MCNC: 7.6 GM/DL (ref 5.8–7.6)
SODIUM SERPL-SCNC: 141 MMOL/L (ref 136–145)

## 2025-03-05 PROCEDURE — 25000003 PHARM REV CODE 250: Performed by: NURSE PRACTITIONER

## 2025-03-05 PROCEDURE — 25000003 PHARM REV CODE 250: Performed by: INTERNAL MEDICINE

## 2025-03-05 PROCEDURE — 80053 COMPREHEN METABOLIC PANEL: CPT | Performed by: INTERNAL MEDICINE

## 2025-03-05 PROCEDURE — 11000001 HC ACUTE MED/SURG PRIVATE ROOM

## 2025-03-05 PROCEDURE — 63600175 PHARM REV CODE 636 W HCPCS: Performed by: NURSE PRACTITIONER

## 2025-03-05 PROCEDURE — 83735 ASSAY OF MAGNESIUM: CPT | Performed by: INTERNAL MEDICINE

## 2025-03-05 PROCEDURE — 99223 1ST HOSP IP/OBS HIGH 75: CPT | Mod: ,,, | Performed by: SURGERY

## 2025-03-05 PROCEDURE — 36415 COLL VENOUS BLD VENIPUNCTURE: CPT | Performed by: INTERNAL MEDICINE

## 2025-03-05 RX ADMIN — PANTOPRAZOLE SODIUM 40 MG: 40 TABLET, DELAYED RELEASE ORAL at 09:03

## 2025-03-05 RX ADMIN — ISOSORBIDE MONONITRATE 30 MG: 30 TABLET, EXTENDED RELEASE ORAL at 09:03

## 2025-03-05 RX ADMIN — MUPIROCIN: 20 OINTMENT TOPICAL at 08:03

## 2025-03-05 RX ADMIN — FUROSEMIDE 20 MG: 20 TABLET ORAL at 09:03

## 2025-03-05 RX ADMIN — AMLODIPINE BESYLATE 10 MG: 5 TABLET ORAL at 09:03

## 2025-03-05 RX ADMIN — PANTOPRAZOLE SODIUM 40 MG: 40 TABLET, DELAYED RELEASE ORAL at 08:03

## 2025-03-05 RX ADMIN — MUPIROCIN: 20 OINTMENT TOPICAL at 09:03

## 2025-03-05 RX ADMIN — HYDRALAZINE HYDROCHLORIDE 10 MG: 20 INJECTION INTRAMUSCULAR; INTRAVENOUS at 05:03

## 2025-03-05 RX ADMIN — LISINOPRIL 40 MG: 20 TABLET ORAL at 09:03

## 2025-03-05 NOTE — H&P
[facility] Hospital Medicine - H&P Note    Patient Name: Davi Glaser  : 1950  MRN: [id number]  PCP: Mirella Rosado MD  Admitting Physician:  DANIELLA Alarcon MD  Admission Class: IP- Inpatient   Code status: Full    Allergies   Nsaids (non-steroidal anti-inflammatory drug) and Ibuprofen    Chief Complaint   Abdominal pain    History of Present Illness   74 yr old male whose history includes gastric mass (GIST) initially diagnosed in , HFpEF, CAD and HTN. Presented to [facility] on 3/1/25 with c/o chest and abdominal pain. Significantly hypertensive on arrival. Admitted for NSTEMI, hypertensive emergency and duodenal mass. CT abdomen/pelvis with contrast revealed an unchanged exophytic mass at the descending portion of the duodenum measuring 1.8 cm without obstruction and other no acute process.     Evaluated by cardiology. Records show he has known CAD by CCTA, PAD and HFpEF. ECHO revealed LVEF 50% with grade 1 diastolic dysfunction and no wall motion abnormalities. NSTEMI attributed to hypertensive emergency. Medications adjusted and deemed an adequate cardiac risk to proceed with endoscopy if needed.     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 Was planned to proceed with EGD and biopsy if cleared by cardiology. However, it was decided to transfer patient to tertiary center for further evaluation and treatment with surgical oncology.     Previous records reviewed. In 2023 he saw hem/onc. At that time he'd not followed up with Dr. Maravilla as arranged and had missed multiple appts with GI. At that time periodic endoscopic surveillance and f/u with GI was recommended. Apparently he had previous colonoscopy but these records are not available for review.     Records show that at discharge he was tolerating clear liquids and experiencing on mild occasional abdominal discomfort.     ROS   Except as  documented, all other systems reviewed and negative     Past Medical History   Gastric Mass - GIST   CAD by CCTA  HFpEF - LVEF 50% with grade 1 diastolic dysfunction - ECHO 3/1/25  PAD   Hypertension   Dyslipidemia  GERD and gastritis   Peyronie's disease    Past Surgical History   Appendectomy    Social History   Denies alcohol, tobacco or illicit drug use.     Screening for Social Drivers for health:  Patient screened for food insecurity, housing instability, transportation needs, utility difficulties, and interpersonal safety (select all that apply as identified as concern)  []Housing or Food  []Transportation Needs  []Utility Difficulties  []Interpersonal safety  [x]None        Family History   Reviewed and negative    Home Medications     Prior to Admission medications    Medication Sig Start Date End Date Taking? Authorizing Provider   amLODIPine (NORVASC) 10 MG tablet Take 10 mg by mouth once daily. 4/28/21  Yes Provider, Historical   amoxicillin-clavulanate 500-125mg (AUGMENTIN) 500-125 mg Tab Take 1 tablet by mouth 2 (two) times daily. 5/16/23   Provider, Historical   clarithromycin (BIAXIN) 500 MG tablet Take 500 mg by mouth 2 (two) times daily. 5/16/23   Provider, Historical   cloNIDine (CATAPRES) 0.2 MG tablet Take 0.2 mg by mouth 2 (two) times daily. 4/28/21   Provider, Historical   dicyclomine (BENTYL) 20 mg tablet TAKE ONE TABLET BY MOUTH THREE TIMES DAILY AS NEEDED stomach pain 6/2/23   Provider, Historical   lisinopriL (PRINIVIL,ZESTRIL) 40 MG tablet Take 40 mg by mouth once daily. 4/28/21   Provider, Historical   pantoprazole (PROTONIX) 40 MG tablet Take 40 mg by mouth 2 (two) times daily. 5/16/23   Provider, Historical   triamcinolone acetonide 0.1% (KENALOG) 0.1 % cream Apply topically 2 (two) times daily.    Provider, Historical        Physical Exam   Vital Signs  Temp:  [97.8 °F (36.6 °C)]   Pulse:  [58]   Resp:  [20]   SpO2:  [97 %]    General: Appears comfortable  HEENT: NC/AT  Neck:  No  "JVD  Chest: CTABL  CVS: Regular rhythm. Normal S1/S2.  Abdomen: nondistended, normoactive BS, soft and non-tender.  MSK: No obvious deformity or joint swelling  Skin: Warm and dry  Neuro: AAOx3, no focal neurological deficit  Psych: Cooperative    Labs     No results for input(s): "WBC", "RBC", "HGB", "HCT", "MCV", "MCH", "MCHC", "RDW", "PLT", "ABSNEUT", "SEDRATE", "HSCRP", "CRP" in the last 72 hours.  No results for input(s): "PROTIME", "INR", "PTT", "D-DIMER", "FERRITIN", "IRON", "TRANS", "TIBC", "LABIRON", "[id number]", "FOLATE", "LDH", "HAPTOGLOBIN", "RETICCNTAUTO", "RETABS", "PERIPSMEAREV" in the last 72 hours.   No results for input(s): "NA", "K", "CHLORIDE", "CO2", "BUN", "CREATININE", "EGFRNORACEVR", "GLUCOSE", "CALCIUM", "MG", "PHOS", "ALBUMIN", "GLOBULIN", "ALKPHOS", "ALT", "AST", "BILITOT", "BILIDIR", "LIPASE", "HGBA1C", "CHOL", "TRIG", "LDL", "HDL", "TSH", "FREET4", "BNP" in the last 72 hours.  No results for input(s): "LACTIC" in the last 72 hours.  No results for input(s): "CPK", "TROPONINI" in the last 72 hours.     Microbiology Results (last 7 days)       ** No results found for the last 168 hours. **           ECHO 3/1/25  1.  Left ventricle: The cavity size is mildly increased. Wall thickness is       mildly increased. Systolic function is at the lower limits of normal.       The estimated ejection fraction is 50%. Doppler parameters are       consistent with abnormal left ventricular relaxation (grade 1 diastolic       dysfunction).   2.  Right ventricle: The cavity size is normal. Wall thickness is normal.       Systolic function is normal. Systolic pressure is moderately increased.       The RV pressure during systole by Doppler is 49 mm Hg.   3.  Left atrium: The atrium is normal in size. The end-systolic volume index       (2-plane Bowling's) is 28 ml/m^2.   4.  Right atrium: The atrium is normal in size.   5.  Mitral valve: The leaflets are normal (thickness). There is no evidence       for " stenosis. There is trivial regurgitation.   6.  Aortic valve: TrileafletThe leaflets are normal thickness. There is no       stenosis. There is no significant regurgitation.   7.  Tricuspid valve: There is trivial regurgitation.   8.  Pulmonic valve: There is no significant regurgitation.   9.  Aortic root: The aortic root is normal.   10. Pericardium, extracardiac: There is no pericardial effusion. No evidence       of pleura fluid accumulation.   11. Inferior vena cava: The vessel is dilated. The internal diameter is 2.5       cm.   IMPRESSION:  Compared to the prior study from 1/25/24, the ejection fraction   is unchanged and the RVSP is higher (previously <35 mmHg).     Imaging   No image results found.    Assessment & Plan     Duodenal mass/GIST tumor   - Clear liquids and NPO after midnight for possible endoscopy in AM  - Continue PO protonix  - consult GI and Surgical Oncology    Hypertension - stable  - continue current meds     HFpEF - LVEF 50% with grade 1 diastolic dysfunction - ECHO 3/1/25 - stable   - continue Lasix 20 mg po daily    Recent NSTEMI secondary to uncontrolled hypertension     VTE Prophylaxis:  Irma Olivas, [facility] have discussed this patients case with Dr. Alarcon who agrees with the diagnosis and treatment plan.      ________________________________________________________________________  I, Dr. Fam Alarcon assumed care of this patient.  For the patient encounter, I performed the substantive portion of the visit, I reviewed the NPPA documentation, treatment plan, and medical decision making.  I had face to face time with this patient.  I have personally reviewed the labs and test results that are presently available. I have reviewed or attempted to review medical records based upon their availability. If patient was admitted under observational status it is with my approval.      74-year-old male, with secondhand reports of a prior GIST tumor found at [facility]  "H[facility] in [place] but no details are available.  He was admitted to [facility] a few days ago with epigastric abdominal pain in his found to have a 1.8 cm duodenal mass (this may be his original just tumor that was diagnosed and was never followed up or intervened on however unsure and need full records).  GI was planning to do EGD with a biopsy of duodenal mass at that facility after cleared by Cardiology due to hypertensive related NSTEMI (cardiology cleared patient echo showed normal EF).    GI at that facility notes on 03/02/2025: "I spoke with Dr Lenin Beach at [facility] and he stated that he did an endoscoppic ultrasound of the duodenal tumor and pathology showed a GIST tumor. He was referred to [place] to Dr Maravilla for surgery but he did not follow up."   For this reason the patient was transferred here for surgical oncology evaluation.  Patient is not clinically showing signs of duodenal obstruction at this time.    Time seen: 11pm 3/4  Fam Alarcon MD     "

## 2025-03-05 NOTE — PLAN OF CARE
"   03/05/25 1617   Discharge Assessment   Assessment Type Discharge Planning Assessment   Confirmed/corrected address, phone number and insurance Yes   Confirmed Demographics Correct on Facesheet   Source of Information patient   Communicated DENIA with patient/caregiver Date not available/Unable to determine   Reason For Admission duodenal obstruction   People in Home alone   Facility Arrived From: [facility]   Do you expect to return to your current living situation? Yes   Do you have help at home or someone to help you manage your care at home? No   Prior to hospitilization cognitive status: Alert/Oriented   Current cognitive status: Alert/Oriented   Walking or Climbing Stairs Difficulty no   Dressing/Bathing Difficulty no   Home Accessibility stairs to enter home   Number of Stairs, Main Entrance three   Home Layout Able to live on 1st floor   Equipment Currently Used at Home none   Patient currently being followed by outpatient case management? No   Do you currently have service(s) that help you manage your care at home? No   Do you take prescription medications? Yes   Do you have prescription coverage? Yes   Do you have any problems affording any of your prescribed medications? No   Is the patient taking medications as prescribed? yes   Are you on dialysis? No   Do you take coumadin? No   Discharge Plan A Other  (TBD)   Discharge Plan B Other  (TBD)   DME Needed Upon Discharge  other (see comments)  (TBD)   Discharge Plan discussed with: Patient   Transition of Care Barriers None     Assessment done with pt in bed. He is aggravated during my assessment and continues to talk over me. Pt assumed I was coming in to discharge him even though I did not say that. He keeps saying nothing will be done and he'll be sent home with "more pills". Unable to get full assessment done as he was getting more agitated and continually said I was trying to discharge him. Nurse was notified and was already aware. Pt lives alone. Adult " kids in [place] per pt.   He gave me a name of Meeta Pedraza for an emergency contact but then couldn't provide phone number.

## 2025-03-05 NOTE — CONSULTS
"[facility]  Surgical Oncology  Consult Note    Consults  Subjective:     Chief Complaint/Reason for Admission: Duodenal mass, biopsy proven GIST    History of Present Illness: This is a 74-year-old male with extensive history regarding duodenal mass. In August 2022 he had CT A/P W CO for unknown reason revealing 1.8 cm hypervascular duodenal nodule and hypervascular hepatic mass concerning for metastatic disease. Pathology report from 8/26/22 at [facility] of liver specimen notes angioma with no malignant cells. EGD-Colonoscopy completed 8/31/22 noting colon polyps removed, findings compatible with Barett's Esophagus and duodenal nodule. All pathology returned without evidence of malignancy. He was referred to Dr Vance for EUS with procedure planned for 10/19/22 though per documentation it appears patient did not have procedure. The following month he underwent EUS with Dr Lenin Beach in [place] again noting duodenal nodule. Biopsy performed and pathology consistent with GIST. Patient was then referred to Dr Maravilla and over the course of three months had three missed appointments, mostly due to transportation issues.     He was established with Dr Short, Medical oncology, most recently seen in office 7/24/23. Plan was not made for treatment, rather encouraged compliance with GI and surg onc with plans to return in 6 months.     Patient was hospitalized in January 2024 with chest and abdominal pain. He was seen by GI in consultation and had another EGD. Biopsies of gastric antrum returned for gastritis. MRI Abd W and WO CO at that time again noted right hepatic mass and duodenal mass.     The initial CT showing GIST noted size of tumor was 1.8 cm which is unchanged in size on CT A/P W CO done at [facility] 3/1/25.       Patient presented to [facility] ED in [place] 3/1/25 after "choking episode." He reports hardly getting food down before he threw it all up and "nearly choked to death." He " endorses unintentional weight loss, poor appetite, and abdominal bloating/distention with minimal oral intake. He endorses dysphagia ongoing since at least diagnosis of GIST. Prior EGD and pathology has shown gastritis.     No current facility-administered medications on file prior to encounter.     Current Outpatient Medications on File Prior to Encounter   Medication Sig    amLODIPine (NORVASC) 10 MG tablet Take 10 mg by mouth once daily.    furosemide (LASIX) 20 MG tablet Take 20 mg by mouth once daily.    irbesartan (AVAPRO) 150 MG tablet Take 150 mg by mouth once daily.    isosorbide mononitrate (IMDUR) 30 MG 24 hr tablet Take 30 mg by mouth once daily.    lisinopriL (PRINIVIL,ZESTRIL) 40 MG tablet Take 40 mg by mouth once daily.    pantoprazole (PROTONIX) 40 MG tablet Take 40 mg by mouth 2 (two) times daily.       Review of patient's allergies indicates:   Allergen Reactions    Nsaids (non-steroidal anti-inflammatory drug) Other (See Comments)     Patient reports not being able to take these and he feels bad when he takes this and does not feel right.       Ibuprofen Nausea Only       Past Medical History:   Diagnosis Date    GERD (gastroesophageal reflux disease)     H. pylori infection     HLD (hyperlipidemia)     Hypertension      Past Surgical History:   Procedure Laterality Date    APPENDECTOMY       Family History       Problem Relation (Age of Onset)    Breast cancer Sister          Tobacco Use    Smoking status: Every Day     Current packs/day: 0.05     Average packs/day: 0.1 packs/day for 50.0 years (2.5 ttl pk-yrs)     Types: Cigarettes    Smokeless tobacco: Never   Substance and Sexual Activity    Alcohol use: Not Currently    Drug use: Not Currently    Sexual activity: Not Currently     Review of Systems   Constitutional:  Negative for chills and fever.   Respiratory:  Negative for chest tightness and shortness of breath.    Cardiovascular:  Negative for chest pain.   Gastrointestinal:  Positive  for abdominal distention, abdominal pain and vomiting.     Objective:     Vital Signs (Most Recent):  Temp: 98 °F (36.7 °C) (03/05/25 0725)  Pulse: 69 (03/05/25 0725)  Resp: 16 (03/05/25 0725)  BP: (!) 143/77 (03/05/25 0725)  SpO2: (!) 92 % (03/05/25 0725) Vital Signs (24h Range):  Temp:  [97.6 °F (36.4 °C)-98 °F (36.7 °C)] 98 °F (36.7 °C)  Pulse:  [48-69] 69  Resp:  [16-20] 16  SpO2:  [92 %-97 %] 92 %  BP: (143-197)/(70-88) 143/77     Weight: 95.3 kg (210 lb)  Body mass index is 24.9 kg/m².    No intake or output data in the 24 hours ending 03/05/25 0935    Physical Exam  Constitutional:       General: He is not in acute distress.  HENT:      Head: Atraumatic.      Mouth/Throat:      Mouth: Mucous membranes are moist.   Eyes:      Extraocular Movements: Extraocular movements intact.      Conjunctiva/sclera: Conjunctivae normal.   Cardiovascular:      Rate and Rhythm: Normal rate and regular rhythm.   Pulmonary:      Effort: Pulmonary effort is normal. No respiratory distress.      Breath sounds: Normal breath sounds.   Abdominal:      General: There is no distension.      Palpations: Abdomen is soft.   Musculoskeletal:         General: No swelling.      Cervical back: Neck supple.   Skin:     General: Skin is warm and dry.   Neurological:      General: No focal deficit present.      Mental Status: He is alert and oriented to person, place, and time.         Significant Labs:  BMP:   Recent Labs   Lab 03/05/25  0716      K 3.6      CO2 26   BUN 8.6   CREATININE 1.40*   CALCIUM 10.3*   MG 2.00     CBC:   Recent Labs   Lab 03/04/25  1915   WBC 6.71   RBC 5.43   HGB 14.9   HCT 44.1      MCV 81.2   MCH 27.4   MCHC 33.8       Significant Diagnostics:  I have reviewed all pertinent imaging results/findings within the past 24 hours.    Assessment/Plan:     Biopsy proven GIST seen initially on CT in August 2022. Hepatic mass, biopsy completed with benign findings       Please have [facility]  Radiology department push images through so they can be reviewed by Dr Maravilla.   No immediate need for surgical intervention at this time. OK for diet as tolerated.       Thank you for your consult. I will follow-up with patient. Please contact us if you have any additional questions.    Deidra Amanda, NP

## 2025-03-05 NOTE — PLAN OF CARE
Problem: Adult Inpatient Plan of Care  Goal: Plan of Care Review  Outcome: Progressing  Goal: Patient-Specific Goal (Individualized)  Outcome: Progressing  Goal: Absence of Hospital-Acquired Illness or Injury  Outcome: Progressing  Goal: Optimal Comfort and Wellbeing  Outcome: Progressing  Goal: Readiness for Transition of Care  Outcome: Progressing   Patient is a new admit, the above care plan has been reviewed with patient upon admission assessment.

## 2025-03-05 NOTE — PROGRESS NOTES
Inpatient Nutrition Evaluation    Admit Date: 3/4/2025   Total duration of encounter: 1 day   Patient Age: 74 y.o.    Nutrition Recommendation/Prescription     -Advance diet as tolerated per MD. Goal Diet: Low Residue Diet.  -Monitor wt, labs, and intake.     Nutrition Assessment     Chart Review    Reason Seen: continuous nutrition monitoring    Malnutrition Screening Tool Results   Have you recently lost weight without trying?: No  Have you been eating poorly because of a decreased appetite?: Yes   MST Score: 1   Diagnosis:  Duodenal mass/GIST tumor   Hypertension - stable  HFpEF - LVEF 50% with grade 1 diastolic dysfunction - ECHO 3/1/25 - stable   Recent NSTEMI secondary to uncontrolled hypertension    Relevant Medical History: gastric mass (GIST) initially diagnosed in 2022, HFpEF, CAD and HTN     Scheduled Medications:  amLODIPine, 10 mg, Daily  enoxparin, 40 mg, Q24H (prophylaxis, 1700)  furosemide, 20 mg, Daily  isosorbide mononitrate, 30 mg, Daily  lisinopriL, 40 mg, Daily  mupirocin, , BID  pantoprazole, 40 mg, BID    Continuous Infusions:   PRN Medications:   Current Facility-Administered Medications:     acetaminophen, 1,000 mg, Oral, Q6H PRN    aluminum-magnesium hydroxide-simethicone, 30 mL, Oral, QID PRN    bisacodyL, 10 mg, Rectal, Daily PRN    dextrose 50%, 12.5 g, Intravenous, PRN    dextrose 50%, 25 g, Intravenous, PRN    glucagon (human recombinant), 1 mg, Intramuscular, PRN    glucose, 16 g, Oral, PRN    glucose, 24 g, Oral, PRN    hydrALAZINE, 10 mg, Intravenous, Q4H PRN    melatonin, 6 mg, Oral, Nightly PRN    naloxone, 0.02 mg, Intravenous, PRN    ondansetron, 4 mg, Intravenous, Q4H PRN    prochlorperazine, 5 mg, Intravenous, Q6H PRN    senna-docusate 8.6-50 mg, 1 tablet, Oral, BID PRN    sodium chloride 0.9%, 10 mL, Intravenous, PRN    Recent Labs   Lab 03/04/25  1915 03/05/25  0716    141   K 3.6 3.6   CALCIUM 10.6* 10.3*   MG 2.10 2.00    105   CO2 25 26   BUN 8.2* 8.6  "  CREATININE 1.42* 1.40*   EGFRNORACEVR 52 52   GLUCOSE 99 105   BILITOT 1.0 1.0   ALKPHOS 89 87   ALT 28 34   AST 32 34   ALBUMIN 4.2 4.0   WBC 6.71  --    HGB 14.9  --    HCT 44.1  --      Nutrition Orders:  Diet Clear Liquid      Appetite/Oral Intake: not applicable/not applicable  Factors Affecting Nutritional Intake: clear liquid diet  Food/Mu-ism/Cultural Preferences: unable to obtain  Food Allergies: no known food allergies  Last Bowel Movement: 03/04/25  Wound(s):  skin intact per EMR    Comments    3/5/25: Pt's diet just advanced to clears this am- pt out for testing; no significant wt loss recently noted per EMR; will f/u early with pt.     Anthropometrics    Height: 6' 5" (195.6 cm), Height Method: Measured  Last Weight: 95.3 kg (210 lb) (03/04/25 1818), Weight Method: Standard Scale  BMI (Calculated): 24.9  BMI Classification: normal (BMI 18.5-24.9)        Ideal Body Weight (IBW), Male: 208 lb     % Ideal Body Weight, Male (lb): 100.96 %                          Usual Weight Provided By: EMR weight history    Wt Readings from Last 5 Encounters:   03/04/25 95.3 kg (210 lb)   07/24/23 101.2 kg (223 lb)   10/21/20 92.9 kg (204 lb 12.9 oz)   05/11/21 93.9 kg (207 lb)     Weight Change(s) Since Admission:   Wt Readings from Last 1 Encounters:   03/04/25 1818 95.3 kg (210 lb)   03/04/25 1808 95.3 kg (210 lb)   Admit Weight: 95.3 kg (210 lb) (03/04/25 1808), Weight Method: Standard Scale    Patient Education     Not applicable.    Nutrition Goals & Monitoring     Dietitian will monitor: energy intake and weight    Nutrition Risk/Follow-Up: low (follow-up in 5-7 days)  Patients assigned 'low nutrition risk' status do not qualify for a full nutritional assessment but will be monitored and re-evaluated in a 5-7 day time period. Please consult if re-evaluation needed sooner.   "

## 2025-03-06 LAB
ANION GAP SERPL CALC-SCNC: 9 MEQ/L
BUN SERPL-MCNC: 10.2 MG/DL (ref 8.4–25.7)
CALCIUM SERPL-MCNC: 9.7 MG/DL (ref 8.8–10)
CHLORIDE SERPL-SCNC: 101 MMOL/L (ref 98–107)
CO2 SERPL-SCNC: 27 MMOL/L (ref 23–31)
CREAT SERPL-MCNC: 1.5 MG/DL (ref 0.72–1.25)
CREAT/UREA NIT SERPL: 7
ERYTHROCYTE [DISTWIDTH] IN BLOOD BY AUTOMATED COUNT: 15.7 % (ref 11.5–17)
GFR SERPLBLD CREATININE-BSD FMLA CKD-EPI: 48 ML/MIN/1.73/M2
GLUCOSE SERPL-MCNC: 103 MG/DL (ref 82–115)
HCT VFR BLD AUTO: 41.2 % (ref 42–52)
HGB BLD-MCNC: 13.8 G/DL (ref 14–18)
MCH RBC QN AUTO: 27.1 PG (ref 27–31)
MCHC RBC AUTO-ENTMCNC: 33.5 G/DL (ref 33–36)
MCV RBC AUTO: 80.9 FL (ref 80–94)
NRBC BLD AUTO-RTO: 0 %
PLATELET # BLD AUTO: 355 X10(3)/MCL (ref 130–400)
PMV BLD AUTO: 10.4 FL (ref 7.4–10.4)
POTASSIUM SERPL-SCNC: 3.5 MMOL/L (ref 3.5–5.1)
RBC # BLD AUTO: 5.09 X10(6)/MCL (ref 4.7–6.1)
SODIUM SERPL-SCNC: 137 MMOL/L (ref 136–145)
WBC # BLD AUTO: 6.94 X10(3)/MCL (ref 4.5–11.5)

## 2025-03-06 PROCEDURE — 36415 COLL VENOUS BLD VENIPUNCTURE: CPT | Performed by: INTERNAL MEDICINE

## 2025-03-06 PROCEDURE — 99231 SBSQ HOSP IP/OBS SF/LOW 25: CPT | Mod: ,,, | Performed by: NURSE PRACTITIONER

## 2025-03-06 PROCEDURE — 80048 BASIC METABOLIC PNL TOTAL CA: CPT | Performed by: INTERNAL MEDICINE

## 2025-03-06 PROCEDURE — 11000001 HC ACUTE MED/SURG PRIVATE ROOM

## 2025-03-06 PROCEDURE — 85027 COMPLETE CBC AUTOMATED: CPT | Performed by: INTERNAL MEDICINE

## 2025-03-06 PROCEDURE — 63600175 PHARM REV CODE 636 W HCPCS: Performed by: NURSE PRACTITIONER

## 2025-03-06 PROCEDURE — 25000003 PHARM REV CODE 250: Performed by: NURSE PRACTITIONER

## 2025-03-06 RX ADMIN — LISINOPRIL 40 MG: 20 TABLET ORAL at 10:03

## 2025-03-06 RX ADMIN — ENOXAPARIN SODIUM 40 MG: 40 INJECTION SUBCUTANEOUS at 04:03

## 2025-03-06 RX ADMIN — FUROSEMIDE 20 MG: 20 TABLET ORAL at 10:03

## 2025-03-06 RX ADMIN — HYDRALAZINE HYDROCHLORIDE 10 MG: 20 INJECTION INTRAMUSCULAR; INTRAVENOUS at 08:03

## 2025-03-06 RX ADMIN — AMLODIPINE BESYLATE 10 MG: 5 TABLET ORAL at 10:03

## 2025-03-06 NOTE — CARE UPDATE
D/W surgical oncology team   We all went to the patient room and informed him that no urgent surgery is recommended for now  He will have out patient surgical oncology F/U   Patient again does not seem to understand the plan. Kept repeating about his vomiting   He has not vomited since admission and he is tolerating po full liquids    Will consult GI team to see if there is anything else they can offer for the patient     Will continue PO PPI BID dose

## 2025-03-06 NOTE — PROGRESS NOTES
[facility]  Hospital Medicine Progress Note        Chief Complaint: Inpatient Follow-up for duodenal mass/GIST    HPI:   74 yr old male whose history includes gastric mass (GIST) initially diagnosed in 2022, HFpEF, CAD and HTN. Presented to [facility] on 3/1/25 with c/o chest and abdominal pain. Significantly hypertensive on arrival. Admitted for NSTEMI, hypertensive emergency and duodenal mass. CT abdomen/pelvis with contrast revealed an unchanged exophytic mass at the descending portion of the duodenum measuring 1.8 cm without obstruction and other no acute process.   Evaluated by cardiology. Records show he has known CAD by CCTA, PAD and HFpEF. ECHO revealed LVEF 50% with grade 1 diastolic dysfunction and no wall motion abnormalities. NSTEMI attributed to hypertensive emergency. Medications adjusted and deemed an adequate cardiac risk to proceed with endoscopy if needed.   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 Was planned to proceed with EGD and biopsy if cleared by cardiology. However, it was decided to transfer patient to tertiary center for further evaluation and treatment with surgical oncology.   Previous records reviewed. In July 2023 he saw hem/onc. At that time he'd not followed up with Dr. Maravilla as arranged and had missed multiple appts with GI, both due to transportation issues as his insurance would not cover his travel from [facility] to [facility] for appointment. At that time periodic endoscopic surveillance and f/u with GI was recommended. Apparently he had previous colonoscopy but these records are not available for review.   Records show that at discharge he was tolerating clear liquids and experiencing on mild occasional abdominal discomfort.   The initial CT showing GIST noted size of tumor was 1.8 cm which is unchanged in size on CT A/P W CO done at [facility] 3/1/25.   Endorses  unintentional weight loss, poor appetite.  Abdominal bloating and distention with minimal oral intake.    Seen by Dr Maravilla (surgical oncologyist). Will f/u on recommendations.     Interval Hx:   Patient today awake and comfortable. Denies any SOB, abdominal pain, fever or chills.   He is from [place]. Has poor understanding of his disease process.     Case was discussed with patient's nurse and  on the floor.    Objective/physical exam:  General: In no acute distress  Chest: Clear to auscultation bilaterally  Heart: RRR, +S1, S2, no appreciable murmur  Abdomen: Soft, nontender, BS +  MSK: Warm, no lower extremity edema, no clubbing or cyanosis  Neurologic: Alert and oriented x4, Cranial nerve II-XII intact, Strength 5/5 in all 4 extremities    VITAL SIGNS: 24 HRS MIN & MAX LAST   Temp  Min: 97.7 °F (36.5 °C)  Max: 98.6 °F (37 °C) 97.7 °F (36.5 °C)   BP  Min: 137/71  Max: 179/75 137/71   Pulse  Min: 47  Max: 80  70   Resp  Min: 16  Max: 18 18   SpO2  Min: 93 %  Max: 95 % 95 %     I have reviewed the following labs:  Recent Labs   Lab 03/04/25 1915 03/06/25  0605   WBC 6.71 6.94   RBC 5.43 5.09   HGB 14.9 13.8*   HCT 44.1 41.2*   MCV 81.2 80.9   MCH 27.4 27.1   MCHC 33.8 33.5   RDW 16.3 15.7    355   MPV 10.7* 10.4     Recent Labs   Lab 03/04/25 1915 03/05/25  0716 03/06/25  0605    141 137   K 3.6 3.6 3.5    105 101   CO2 25 26 27   BUN 8.2* 8.6 10.2   CREATININE 1.42* 1.40* 1.50*   CALCIUM 10.6* 10.3* 9.7   MG 2.10 2.00  --    ALBUMIN 4.2 4.0  --    ALKPHOS 89 87  --    ALT 28 34  --    AST 32 34  --    BILITOT 1.0 1.0  --      Microbiology Results (last 7 days)       ** No results found for the last 168 hours. **             See below for Radiology    Assessment/Plan:  Duodenal mass/GIST tumor - biopsy-proven  Hypertension-stable  Recent NSTEMI secondary to uncontrolled hypertension at outlying facility  HFpEF - LVEF 50% with grade 1 diastolic dysfunction - ECHO 3/1/25 -  stable   Mild hypercalcemia- corrected calcium 10.4  Mild EMERY vs underlying CKD : stable     Plan:  Patient looks comfortable  On full liquid diet   Seen by surgical oncology team, they will review images from [place] and give recommendations    Patient has poor understanding of his disease process    Reviewed today's labs and  Hb 13.8, Plt 355, BUN 10, Crt 1.50    VTE prophylaxis: Lovenox     Patient condition:  Fair    Anticipated discharge and Disposition:   Home when cleared by surgical oncologist       All diagnosis and differential diagnosis have been reviewed; assessment and plan has been documented; I have personally reviewed the labs and test results that are presently available; I have reviewed the patients medication list; I have reviewed the consulting providers response and recommendations. I have reviewed or attempted to review medical records based upon their availability    All of the patient's questions have been  addressed and answered. Patient's is agreeable to the above stated plan. I will continue to monitor closely and make adjustments to medical management as needed.    Portions of this note dictated using EMR integrated voice recognition software, and may be subject to voice recognition errors not corrected at proofreading. Please contact writer for clarification if needed.   _____________________________________________________________________    Malnutrition Status:  Nutrition consulted. Most recent weight and BMI monitored-     Measurements:  Wt Readings from Last 1 Encounters:   03/04/25 95.3 kg (210 lb)   Body mass index is 24.9 kg/m².    Patient has been screened and assessed by RD.    Malnutrition Type:  Context:    Level:      Malnutrition Characteristic Summary:       Interventions/Recommendations (treatment strategy):        Scheduled Med:   amLODIPine  10 mg Oral Daily    enoxparin  40 mg Subcutaneous Q24H (prophylaxis, 1700)    furosemide  20 mg Oral Daily    isosorbide  mononitrate  30 mg Oral Daily    lisinopriL  40 mg Oral Daily    mupirocin   Nasal BID    pantoprazole  40 mg Oral BID      Continuous Infusions:     PRN Meds:    Current Facility-Administered Medications:     acetaminophen, 1,000 mg, Oral, Q6H PRN    aluminum-magnesium hydroxide-simethicone, 30 mL, Oral, QID PRN    bisacodyL, 10 mg, Rectal, Daily PRN    dextrose 50%, 12.5 g, Intravenous, PRN    dextrose 50%, 25 g, Intravenous, PRN    glucagon (human recombinant), 1 mg, Intramuscular, PRN    glucose, 16 g, Oral, PRN    glucose, 24 g, Oral, PRN    hydrALAZINE, 10 mg, Intravenous, Q4H PRN    melatonin, 6 mg, Oral, Nightly PRN    naloxone, 0.02 mg, Intravenous, PRN    ondansetron, 4 mg, Intravenous, Q4H PRN    prochlorperazine, 5 mg, Intravenous, Q6H PRN    senna-docusate 8.6-50 mg, 1 tablet, Oral, BID PRN    sodium chloride 0.9%, 10 mL, Intravenous, PRN     Radiology:  I have personally reviewed the following imaging and agree with the radiologist.     No image results found.      Leighton Branch MD  Department of Hospital Medicine   [facility]   03/06/2025

## 2025-03-06 NOTE — CONSULTS
[facility]   Consult Note  Consults       GI consulted for dysphagia     HPI:  He is a 74-year-old male known to Dr. Vance with a PMH of adenomatous colon polyps, CAD, HTN, HFpEF, and duodenal GIST (dx 2022) being followed by Surgical Oncology with no growth change on imaging since 2022 who presented after a choking episode apparently he reports significant dysphagia and subsequent emesis.  Additionally reports unintentional weight loss, poor appetite, and abdominal bloating with minimal oral intake.  Evaluated by Surgical Oncology who feel that current symptoms are not related to small GIST tumor.  There are plans for resection not urgently as an outpatient, however noted patient has had issues with transportation.  Current CT A/P with IV contrast showed no abnormal GI findings other than known duodenal mass.  Tolerating full liquid diet since admit.       Prior endoscopies:  01/28/2024 SBE for abnormal imaging of the GI tract per Dr. Allie Cat at [facility]:  Small HH; distal gastritis, biopsied.  Normal upper endoscopy the proximal jejunum otherwise.  Pathology:  Moderate chronic gastritis, negative H pylori.    11/10/2022 EUS per Dr. Celestina Schulz in :  Abnormal mural EUS appearance of duodenal sweep, s/p FNB; abnormal EUS evaluation of the pancreas and liver parenchyma; Pathology of 2nd portion of duodenum:  Spindle cell lesion with morphologic and immunohistochemical findings consistent with gastrointestinal tumor    08/31/2022 EGD / colonoscopy per Dr. Celestina Schulz in  for epigastric pain and duodenal nodule:  Erosive gastritis; findings compatible with Barretts; nodule with 2 central ulcerations in the 2nd part of the duodenum.  TV polyps x2, rectal polyps x1.  Pathology: Duodenal nodule benign duodenal mucosa; gastric biopsy with chronic active gastritis and +H pylori; GE junction biopsy with reflux esophagitis; TC w/TA x1, Rectal polyp HP.    08/26/2022 liver  biopsy for hypervascular liver lesion:  Benign angioma; No malignant tumor cells identified    01/11/2021 colonoscopy for hematochezia, history of colon polyps per Dr. Lenin Vance:  DC polyp, SC polyp x2, internal hemorrhoids.  Pathology: DC TA x1, SC TA x1 and benign Schwann cell hamartoma        ROS: Negative unless stated in HPI    Medical History:   Past Medical History:   Diagnosis Date    GERD (gastroesophageal reflux disease)     H. pylori infection     HLD (hyperlipidemia)     Hypertension        Surgical History:   Past Surgical History:   Procedure Laterality Date    APPENDECTOMY         Social History:  Social History     Tobacco Use    Smoking status: Every Day     Current packs/day: 0.05     Average packs/day: 0.1 packs/day for 50.0 years (2.5 ttl pk-yrs)     Types: Cigarettes    Smokeless tobacco: Never   Substance Use Topics    Alcohol use: Not Currently       Family History:  Reviewed - noncontributory     Allergies:  Review of patient's allergies indicates:   Allergen Reactions    Nsaids (non-steroidal anti-inflammatory drug) Other (See Comments)     Patient reports not being able to take these and he feels bad when he takes this and does not feel right.       Ibuprofen Nausea Only       MEDS: Reviewed in EMR    PHYSICAL EXAM:  T 97.7 °F (36.5 °C)   /71   P 70   RR 18   O2 95 %  GENERAL: NAD; does not appear toxic  SKIN: no rash, no jaundice  HEENT: sclera non-icteric; PERRL  NECK: supple; no LAD  CHEST: CTA; nonlabored, equal expansion; no adventitious BS  CARDIOVASCULAR: RRR, S1S2; no murmur; strong, equal peripheral pulses; no edema  ABDOMEN:  active bowel sounds; abdomen soft, nondistended, nontender to palpation  EXTREMITIES: no cyanosis or clubbing  NEURO: AAO, baseline    Labs:   Recent Labs     03/04/25  1915 03/06/25  0605   WBC 6.71 6.94   RBC 5.43 5.09   HGB 14.9 13.8*   HCT 44.1 41.2*   MCV 81.2 80.9   MCH 27.4 27.1   MCHC 33.8 33.5   RDW 16.3 15.7    355     No results for  "input(s): "LACTIC" in the last 72 hours.  No results for input(s): "INR", "APTT", "D-DIMER" in the last 72 hours.  No results for input(s): "HGBA1C", "CHOL", "TRIG", "LDL", "VLDL", "HDL" in the last 72 hours.   Recent Labs     03/04/25  1915 03/05/25  0716 03/06/25  0605    141 137   K 3.6 3.6 3.5   CO2 25 26 27   BUN 8.2* 8.6 10.2   CREATININE 1.42* 1.40* 1.50*   GLUCOSE 99 105 103   CALCIUM 10.6* 10.3* 9.7   MG 2.10 2.00  --    ALBUMIN 4.2 4.0  --    GLOBULIN 3.8* 3.6*  --    ALKPHOS 89 87  --    ALT 28 34  --    AST 32 34  --    BILITOT 1.0 1.0  --      No results for input(s): "BNP", "CPK", "TROPONINI" in the last 72 hours.        Imaging: Reviewed pertinent imaging    ASSESSMENT / PLAN:  He is a 74-year-old male known to Dr. Vance with a PMH of adenomatous colon polyps, CAD, HTN, HFpEF, and duodenal GIST (dx 2022) being followed by Surgical Oncology with no growth change on imaging since 2022 who presented after a choking episode apparently he reports significant dysphagia and subsequent emesis.  Additionally reports unintentional weight loss, poor appetite, and abdominal bloating with minimal oral intake.  Evaluated by Surgical Oncology who feel that current symptoms are not related to small GIST tumor.  There are plans for resection not urgently as an outpatient, however noted patient has had issues with transportation.  Current CT a/P with IV contrast showed no abnormal GI findings other than known duodenal mass.  GI consulted for dysphagia.     Solid food dysphagia  Bloating  H/o duodenal GIST being followed by surgical oncology - no change on imaging since dx in 2022  H/o H pylori gastritis 8/2022      NPO at [facility] for EGD in am  Continue FLD today.   Continue PPI BID.   Further to follow with MD on rounds.   Discussed with patient, nursing, and Dr. Oliver.           Thank you for allowing us to participate in the care of Davi Glaser.    Marleny Mayen, BRITTANY  [facility]

## 2025-03-06 NOTE — PROGRESS NOTES
[facility]   Surgical Oncology  Progress Note    Subjective:     Interval History: Patient tolerating clear liquid diet. No acute complaints. Patient examined with Dr Oliver at bedside.     Post-Op Info:  * No surgery found *          Medications:  Continuous Infusions:  Scheduled Meds:   amLODIPine  10 mg Oral Daily    enoxparin  40 mg Subcutaneous Q24H (prophylaxis, 1700)    furosemide  20 mg Oral Daily    isosorbide mononitrate  30 mg Oral Daily    lisinopriL  40 mg Oral Daily    mupirocin   Nasal BID    pantoprazole  40 mg Oral BID     PRN Meds:    Current Facility-Administered Medications:     acetaminophen, 1,000 mg, Oral, Q6H PRN    aluminum-magnesium hydroxide-simethicone, 30 mL, Oral, QID PRN    bisacodyL, 10 mg, Rectal, Daily PRN    dextrose 50%, 12.5 g, Intravenous, PRN    dextrose 50%, 25 g, Intravenous, PRN    glucagon (human recombinant), 1 mg, Intramuscular, PRN    glucose, 16 g, Oral, PRN    glucose, 24 g, Oral, PRN    hydrALAZINE, 10 mg, Intravenous, Q4H PRN    melatonin, 6 mg, Oral, Nightly PRN    naloxone, 0.02 mg, Intravenous, PRN    ondansetron, 4 mg, Intravenous, Q4H PRN    prochlorperazine, 5 mg, Intravenous, Q6H PRN    senna-docusate 8.6-50 mg, 1 tablet, Oral, BID PRN    sodium chloride 0.9%, 10 mL, Intravenous, PRN     Objective:     Vital Signs (Most Recent):  Temp: 97.7 °F (36.5 °C) (03/06/25 1205)  Pulse: 70 (03/06/25 1205)  Resp: 18 (03/06/25 1205)  BP: 137/71 (03/06/25 1205)  SpO2: 95 % (03/06/25 0734) Vital Signs (24h Range):  Temp:  [97.7 °F (36.5 °C)-98.6 °F (37 °C)] 97.7 °F (36.5 °C)  Pulse:  [47-80] 70  Resp:  [16-18] 18  SpO2:  [93 %-95 %] 95 %  BP: (137-179)/(67-81) 137/71       Intake/Output Summary (Last 24 hours) at 3/6/2025 1336  Last data filed at 3/5/2025 1800  Gross per 24 hour   Intake 480 ml   Output --   Net 480 ml       Physical Exam  Constitutional:       General: He is not in acute distress.  HENT:      Head: Atraumatic.      Mouth/Throat:       Mouth: Mucous membranes are moist.   Eyes:      Extraocular Movements: Extraocular movements intact.      Conjunctiva/sclera: Conjunctivae normal.   Cardiovascular:      Rate and Rhythm: Normal rate and regular rhythm.   Pulmonary:      Effort: Pulmonary effort is normal. No respiratory distress.      Breath sounds: Normal breath sounds.   Abdominal:      General: There is no distension.      Palpations: Abdomen is soft.   Musculoskeletal:         General: No swelling.      Cervical back: Neck supple.   Skin:     General: Skin is warm and dry.   Neurological:      General: No focal deficit present.      Mental Status: He is alert and oriented to person, place, and time.         Significant Labs:  BMP:   Recent Labs   Lab 03/05/25  0716 03/06/25  0605    137   K 3.6 3.5    101   CO2 26 27   BUN 8.6 10.2   CREATININE 1.40* 1.50*   CALCIUM 10.3* 9.7   MG 2.00  --      CBC:   Recent Labs   Lab 03/06/25  0605   WBC 6.94   RBC 5.09   HGB 13.8*   HCT 41.2*      MCV 80.9   MCH 27.1   MCHC 33.5       Significant Diagnostics:    No new imaging for review     Assessment/Plan:     No indication for surgical intervention at this time. Discussed in detail with patient. He will follow up with our service outpatient in one month to discuss eventual surgical removal of GIST.     Recommend GI consultation if further evaluation of symptoms is warranted.    Deidra Amanda NP  Surgical Oncology  [facility]

## 2025-03-07 ENCOUNTER — ANESTHESIA (OUTPATIENT)
Dept: ENDOSCOPY | Facility: HOSPITAL | Age: 75
End: 2025-03-07
Payer: MEDICARE

## 2025-03-07 ENCOUNTER — ANESTHESIA EVENT (OUTPATIENT)
Dept: ENDOSCOPY | Facility: HOSPITAL | Age: 75
End: 2025-03-07
Payer: MEDICARE

## 2025-03-07 VITALS
RESPIRATION RATE: 18 BRPM | WEIGHT: 210 LBS | HEIGHT: 77 IN | TEMPERATURE: 98 F | OXYGEN SATURATION: 97 % | SYSTOLIC BLOOD PRESSURE: 163 MMHG | BODY MASS INDEX: 24.79 KG/M2 | HEART RATE: 67 BPM | DIASTOLIC BLOOD PRESSURE: 80 MMHG

## 2025-03-07 PROCEDURE — 63600175 PHARM REV CODE 636 W HCPCS: Performed by: NURSE ANESTHETIST, CERTIFIED REGISTERED

## 2025-03-07 PROCEDURE — 25000003 PHARM REV CODE 250: Performed by: INTERNAL MEDICINE

## 2025-03-07 PROCEDURE — 25000003 PHARM REV CODE 250: Performed by: NURSE PRACTITIONER

## 2025-03-07 PROCEDURE — 88305 TISSUE EXAM BY PATHOLOGIST: CPT | Performed by: STUDENT IN AN ORGANIZED HEALTH CARE EDUCATION/TRAINING PROGRAM

## 2025-03-07 PROCEDURE — 37000008 HC ANESTHESIA 1ST 15 MINUTES: Performed by: STUDENT IN AN ORGANIZED HEALTH CARE EDUCATION/TRAINING PROGRAM

## 2025-03-07 PROCEDURE — 0DB68ZX EXCISION OF STOMACH, VIA NATURAL OR ARTIFICIAL OPENING ENDOSCOPIC, DIAGNOSTIC: ICD-10-PCS | Performed by: STUDENT IN AN ORGANIZED HEALTH CARE EDUCATION/TRAINING PROGRAM

## 2025-03-07 PROCEDURE — 88342 IMHCHEM/IMCYTCHM 1ST ANTB: CPT

## 2025-03-07 PROCEDURE — 43239 EGD BIOPSY SINGLE/MULTIPLE: CPT | Performed by: STUDENT IN AN ORGANIZED HEALTH CARE EDUCATION/TRAINING PROGRAM

## 2025-03-07 RX ORDER — LIDOCAINE HYDROCHLORIDE 20 MG/ML
INJECTION INTRAVENOUS
Status: DISCONTINUED | OUTPATIENT
Start: 2025-03-07 | End: 2025-03-07

## 2025-03-07 RX ORDER — PANTOPRAZOLE SODIUM 40 MG/1
40 TABLET, DELAYED RELEASE ORAL 2 TIMES DAILY
Qty: 180 TABLET | Refills: 0 | Status: ON HOLD | OUTPATIENT
Start: 2025-03-07 | End: 2025-03-15

## 2025-03-07 RX ORDER — PANTOPRAZOLE SODIUM 40 MG/1
40 TABLET, DELAYED RELEASE ORAL 2 TIMES DAILY
Qty: 180 TABLET | Refills: 0 | Status: SHIPPED | OUTPATIENT
Start: 2025-03-07 | End: 2025-03-07

## 2025-03-07 RX ORDER — LIDOCAINE HYDROCHLORIDE 10 MG/ML
INJECTION, SOLUTION EPIDURAL; INFILTRATION; INTRACAUDAL; PERINEURAL
Status: DISCONTINUED
Start: 2025-03-07 | End: 2025-03-07 | Stop reason: HOSPADM

## 2025-03-07 RX ORDER — PROPOFOL 10 MG/ML
VIAL (ML) INTRAVENOUS
Status: COMPLETED
Start: 2025-03-07 | End: 2025-03-07

## 2025-03-07 RX ORDER — PROPOFOL 10 MG/ML
VIAL (ML) INTRAVENOUS
Status: DISCONTINUED
Start: 2025-03-07 | End: 2025-03-07 | Stop reason: HOSPADM

## 2025-03-07 RX ORDER — PROPOFOL 10 MG/ML
VIAL (ML) INTRAVENOUS
Status: DISCONTINUED | OUTPATIENT
Start: 2025-03-07 | End: 2025-03-07

## 2025-03-07 RX ORDER — SUCRALFATE 1 G/1
1 TABLET ORAL
Qty: 40 TABLET | Refills: 0 | Status: ON HOLD | OUTPATIENT
Start: 2025-03-07 | End: 2025-03-15 | Stop reason: HOSPADM

## 2025-03-07 RX ORDER — SUCRALFATE 1 G/1
1 TABLET ORAL
Status: DISCONTINUED | OUTPATIENT
Start: 2025-03-07 | End: 2025-03-07 | Stop reason: HOSPADM

## 2025-03-07 RX ORDER — SUCRALFATE 1 G/1
1 TABLET ORAL
Qty: 40 TABLET | Refills: 0 | Status: SHIPPED | OUTPATIENT
Start: 2025-03-07 | End: 2025-03-07

## 2025-03-07 RX ADMIN — FUROSEMIDE 20 MG: 20 TABLET ORAL at 09:03

## 2025-03-07 RX ADMIN — LIDOCAINE HYDROCHLORIDE 50 MG: 20 INJECTION INTRAVENOUS at 10:03

## 2025-03-07 RX ADMIN — SUCRALFATE 1 G: 1 TABLET ORAL at 06:03

## 2025-03-07 RX ADMIN — LISINOPRIL 40 MG: 20 TABLET ORAL at 09:03

## 2025-03-07 RX ADMIN — PROPOFOL 250 MG: 10 INJECTION, EMULSION INTRAVENOUS at 10:03

## 2025-03-07 RX ADMIN — AMLODIPINE BESYLATE 10 MG: 5 TABLET ORAL at 09:03

## 2025-03-07 NOTE — ANESTHESIA POSTPROCEDURE EVALUATION
Anesthesia Post Evaluation    Patient: Davi Glaser    Procedure(s) Performed: Procedure(s) (LRB):  EGD, WITH CLOSED BIOPSY (N/A)    Final Anesthesia Type: general      Patient location during evaluation: PACU  Patient participation: Yes- Able to Participate  Level of consciousness: awake and alert and oriented  Post-procedure vital signs: reviewed and stable  Pain management: adequate  Airway patency: patent  ZULLY mitigation strategies: Verification of full reversal of neuromuscular block  PONV status at discharge: No PONV  Anesthetic complications: no      Cardiovascular status: blood pressure returned to baseline and stable  Respiratory status: spontaneous ventilation and unassisted  Hydration status: euvolemic  Follow-up not needed.  Comments: [facility]              Vitals Value Taken Time   /70 03/07/25 11:05   Temp 36 °C (96.8 °F) 03/07/25 11:05   Pulse 73 03/07/25 11:05   Resp 17 03/07/25 11:05   SpO2 98 % 03/07/25 11:05         No case tracking events are documented in the log.      Pain/Travis Score: Travis Score: 9 (3/7/2025 11:14 AM)

## 2025-03-07 NOTE — TRANSFER OF CARE
Anesthesia Transfer of Care Note    Patient: Davi Glaser    Procedure(s) Performed: Procedure(s) (LRB):  EGD, WITH CLOSED BIOPSY (N/A)    Patient location: GI    Anesthesia Type: general    Transport from OR: Transported from OR on room air with adequate spontaneous ventilation    Post pain: adequate analgesia    Post assessment: no apparent anesthetic complications    Post vital signs: stable    Level of consciousness: responds to stimulation    Nausea/Vomiting: no nausea/vomiting    Complications: none    Transfer of care protocol was followed

## 2025-03-07 NOTE — PROVATION PATIENT INSTRUCTIONS
Discharge Summary/Instructions after an Endoscopic Procedure  Patient Name: Davi Glaser  Patient MRN: [id number]  Patient YOB: 1950  Friday, March 7, 2025  Lauri Gupta MD  Dear patient,  As a result of recent federal legislation (The Federal Cures Act), you may   receive lab or pathology results from your procedure in your [facility]   account before your physician is able to contact you. Your physician or   their representative will relay the results to you with their   recommendations at their soonest availability.  Thank you,  RESTRICTIONS:  During your procedure today, you received medications for sedation.  These   medications may affect your judgment, balance and coordination.  Therefore,   for 24 hours, you have the following restrictions:   - DO NOT drive a car, operate machinery, make legal/financial decisions,   sign important papers or drink alcohol.    ACTIVITY:  Today: no heavy lifting, straining or running due to procedural   sedation/anesthesia.  The following day: return to full activity including work.  DIET:  Eat and drink normally unless instructed otherwise.     TREATMENT FOR COMMON SIDE EFFECTS:  - Mild abdominal pain, nausea, belching, bloating or excessive gas:  rest,   eat lightly and use a heating pad.  - Sore Throat: treat with throat lozenges and/or gargle with warm salt   water.  - Because air was used during the procedure, expelling large amounts of air   from your rectum or belching is normal.  - If a bowel prep was taken, you may not have a bowel movement for 1-3 days.    This is normal.  SYMPTOMS TO WATCH FOR AND REPORT TO YOUR PHYSICIAN:  1. Abdominal pain or bloating, other than gas cramps.  2. Chest pain.  3. Back pain.  4. Signs of infection such as: chills or fever occurring within 24 hours   after the procedure.  5. Rectal bleeding, which would show as bright red, maroon, or black stools.   (A tablespoon of blood from the rectum is not serious, especially if    hemorrhoids are present.)  6. Vomiting.  7. Weakness or dizziness.  GO DIRECTLY TO THE NEAREST EMERGENCY ROOM IF YOU HAVE ANY OF THE FOLLOWING:      Difficulty breathing              Chills and/or fever over 101 F   Persistent vomiting and/or vomiting blood   Severe abdominal pain   Severe chest pain   Black, tarry stools   Bleeding- more than one tablespoon   Any other symptom or condition that you feel may need urgent attention  Your doctor recommends these additional instructions:  If any biopsies were taken, your doctors clinic will contact you in 1 to 2   weeks with any results.  Recommendations:  - [facility].   - Resume previous diet.   - Continue present medications.   - Await pathology results.   - Meds to consider for GI symptoms: bentyl, carafate, H2 blockers, PPIs,   FDgard, IBgard  - Return to GI clinic for next availability for continued work up and   management of dysphagia  - Ok for discharge from GI standpoint  - Please call with questions or concerns  Impressions:  EGD for dysphagia and epigastric pain  - Normal esophagus. Suspect motility issue is etiology of any dysphagia  - Gastritis.  Biopsied.   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  [facility] ED at [phone]  IF A COMPLICATION OR EMERGENCY SITUATION ARISES AND YOU ARE UNABLE TO REACH   YOUR PHYSICIAN - GO DIRECTLY TO THE EMERGENCY ROOM.  MD Lauri Lerma MD  3/7/2025 11:06:54 AM  This report has been verified and signed electronically.  Dear patient,  As a result of recent federal legislation (The Federal Cures Act), you may   receive lab or pathology results from your procedure in your [facility]   account before your physician is able to contact you. Your physician or   their representative will  relay the results to you with their   recommendations at their soonest availability.  Thank you,  PROVATION

## 2025-03-07 NOTE — ANESTHESIA PREPROCEDURE EVALUATION
"          Codes Last Modified   Gastrointestinal stromal tumor (GIST)           Hospital Course:  SUMMARY:74 yr old male hx of HTN, gastritis, CAD, history of duodenal mass s/p EGD with Dr Cat in 2024 showing gastritis (EUS recommended at that time) presents with abdominal pain, bloating and chest pain. Found to have HTN emergency, elevated troponin. CT showed Unchanged exophytic mass at the descending portion of the duodenum measuring 1.8 cm. Troponin was elevated at 500. TTE showed normal EF, no rWMA. Started on ACS pathway, BP medication and GI/cardiology consulted. During evaluation and collection of outside records-->GI Dr Eagle recommended referral to tertiary center for GIST tumor (found at [facility]). Cardiology recommended follow up outpatient after GIST tumor addressed.    Problem List:  Duodenal outlet obstruction, due to GIST tumor  Chest pain  NSTEMI type 2  Generalized abdominal pain  HTN Emergency  Gastritis  Today on the day of discharge patient is fully awake alert and oriented, sitting up on bed, tolerating clear liquid, get occasional mild abdominal pain, history WBC 6.2, hemoglobin 13.2, hematocrit 30.9.1, platelet 342. His creatinine has come down to 1.23, BUN is 10, chloride 102.  Patient is hemodynamically stable to be transferred to higher level of care.    Discharge Vital Signs: Visit Vitals  BP (!) 148/71  Pulse 59  Temp 36.3 °C (97.4 °F) (Oral)  Resp 18  Ht 1.956 m (6' 5.01")  Wt 95.2 kg  Body mass index is 24.89 kg/m².  Collection Time: 03/04/25 7:20 AM  Result Value Ref Range  WBC 6.2 4.6 - 10.2 10*3/uL  Red Cell Count 4.88 4.70 - 6.10 10*6/uL  Hemoglobin 13.2 (L) 14.0 - 18.0 g/dL  Hematocrit 39.1 (L) 42.0 - 52.0 %  Mean Corpuscular Volume 80.1 80.0 - 97.0 fL  Mean Corpuscular Hgb 27.0 27.0 - 31.2 pg  Mean Corpuscular Hgb Conc 33.8 31.8 - 35.4 g/dL  Red Cell Distribution Width 15.9 12.5 - 18.0 %  Platelet Count 342 142 - 424 10*3/uL  Mean Platelet Volume 10.7 9.2 - 12.2 " fL  Neutrophils % 55.4 50.0 - 80.0 %  Immature Granulocyte % 0.2 0.0 - 0.4 %  Lymphocytes % 31.0 25.0 - 40.0 %  Monocytes % 8.2 2.0 - 10.0 %  Eosinophils % 4.7 0.0 - 6.0 %  Basophils % 0.5 0.0 - 3.0 %  Nucleated RBC % 0.0 0.0 - 0.2 %  Neutrophils Absolute 3.44 1.80 - 7.70 10*3/uL  Comprehensive Metabolic Panel  Collection Time: 03/04/25 7:20 AM  Result Value Ref Range  Glucose 97 74 - 106 mg/dL  BUN 10 7 - 18 mg/dL  Creatinine 1.23 0.70 - 1.30 mg/dL  Est Glomerular Filtration Rate 61.22 >=60.00 mL/min/1.73m2  Sodium 143 131 - 143 mmol/L  Potassium 4.0 3.5 - 5.1 mmol/L  Chloride 108 (H) 98 - 107 mmol/L  Carbon Dioxide 27 21 - 32 mmol/L  Anion Gap 8 3 - 11 mmol/L  BUN/Creatinine Ratio 8 7 - 18 mg/dL  Osmolality, Calculated 284 275 - 295  Calcium 9.5 8.5 - 10.1 mg/dL  Total Protein 6.8 6.4 - 8.2 g/dL  Albumin/Globulin Ratio 1.1 1.1 - 1.8 Ratio  Albumin 3.5 3.4 - 5.0 g/dL  Globulin 3.3 2.3 - 3.5 g/dL  AST / SGOT 25 15 - 37 U/L  ALT / SGPT 24 16 - 61 U/L  Alkaline Phosphatase 78 45 - 117 U/L  Bilirubin Total 0.8 0.2 - 1.0 mg/dL  Light Blue Top  Collection Time: 03/04/25 7:32 AM  Result Value Ref Range  HOLD SPECIMEN Hold Specimen  Light Green Top  Collection Time: 03/04/25 7:32 AM    Case: [id number] Date/Time: 03/07/25 1000   Procedure: EGD (Abdomen)   Anesthesia type: General/MAC   Diagnosis: Dysphagia, unspecified type [R13.10]   Location: [facility] ENDO 02 / [facility] ENDOSCOPY   Surgeons: Lauri Gupta MD       Pre-op Assessment    I have reviewed the Patient Summary Reports.     I have reviewed the Nursing Notes. I have reviewed the NPO Status.   I have reviewed the Medications.     Review of Systems  Anesthesia Hx:  No problems with previous Anesthesia               Denies Personal Hx of Anesthesia complications.                    Hematology/Oncology:  Hematology Normal   Oncology Normal                                   EENT/Dental:  EENT/Dental Normal           Cardiovascular:  Exercise tolerance: good    Hypertension   CAD  asymptomatic CABG/stent      Denies Orthopnea.          Functional Capacity good / => 4 METS                         Pulmonary:  Pulmonary Normal                       Renal/:   Denies Chronic Renal Disease.                Hepatic/GI:     GERD                Musculoskeletal:  Musculoskeletal Normal                Neurological:  Neurology Normal                                      Endocrine:  Endocrine Normal          Obesity / BMI > 30Denies Morbid Obesity / BMI > 40  Dermatological:  Skin Normal    Psych:  Psychiatric Normal                    Physical Exam  General: Alert, Oriented, Well nourished and Cooperative    Airway:  Mallampati: III   Mouth Opening: Small, but > 3cm  TM Distance: Normal  Tongue: Normal  Neck ROM: Normal ROM    Dental:  Intact    Chest/Lungs:  Clear to auscultation, Normal Respiratory Rate    Heart:  Rate: Normal  Rhythm: Regular Rhythm       Latest Reference Range & Units 03/06/25 06:05   WBC 4.50 - 11.50 x10(3)/mcL 6.94   RBC 4.70 - 6.10 x10(6)/mcL 5.09   Hemoglobin 14.0 - 18.0 g/dL 13.8 (L)   Hematocrit 42.0 - 52.0 % 41.2 (L)   MCV 80.0 - 94.0 fL 80.9   MCH 27.0 - 31.0 pg 27.1   MCHC 33.0 - 36.0 g/dL 33.5   RDW 11.5 - 17.0 % 15.7   Platelet Count 130 - 400 x10(3)/mcL 355   MPV 7.4 - 10.4 fL 10.4   nRBC % 0.0   Sodium 136 - 145 mmol/L 137   Potassium 3.5 - 5.1 mmol/L 3.5   Chloride 98 - 107 mmol/L 101   CO2 23 - 31 mmol/L 27   Anion Gap mEq/L 9.0   BUN 8.4 - 25.7 mg/dL 10.2   Creatinine 0.72 - 1.25 mg/dL 1.50 (H)   BUN/CREAT RATIO  7   eGFR mL/min/1.73/m2 48   Glucose 82 - 115 mg/dL 103   Calcium 8.8 - 10.0 mg/dL 9.7   (L): Data is abnormally low  (H): Data is abnormally high    Anesthesia Plan  Type of Anesthesia, risks & benefits discussed:    Anesthesia Type: Gen Natural Airway  Intra-op Monitoring Plan: Standard ASA Monitors  Post Op Pain Control Plan: multimodal analgesia  Induction:  IV  Airway Plan: Direct  Informed Consent: Informed consent signed with the  Patient and all parties understand the risks and agree with anesthesia plan.  All questions answered. Patient consented to blood products? Yes  ASA Score: 3  Day of Surgery Review of History & Physical: H&P Update referred to the surgeon/provider.    Ready For Surgery From Anesthesia Perspective.     .

## 2025-03-07 NOTE — PLAN OF CARE
03/07/25 1555   Final Note   Assessment Type Final Discharge Note   Anticipated Discharge Disposition Home   What phone number can be called within the next 1-3 days to see how you are doing after discharge? [phone]   Post-Acute Status   Discharge Delays None known at this time     Pt being discharged home. Nurse will call [employer] when ready. GI office closed-unable to schedule follow up. Nurse put in binder for  to call Monday. No further dc needs at this time.

## 2025-03-07 NOTE — DISCHARGE SUMMARY
[facility] Hospital Medicine Discharge Summary    Admit Date: 3/4/2025  Discharge Date and Time: 3/7/62432:36 PM  Admitting Physician:  Team  Discharging Physician: Leighton Branch MD.  Primary Care Physician: Mirella Rosado MD  Consults: Gastroenterology    Discharge Diagnoses:  Gastritis   Duodenal mass   Recent NSTEMI secondary to uncontrolled hypertension at outlying facility  HFpEF - LVEF 50% with grade 1 diastolic dysfunction - ECHO 3/1/25 - stable   Mild hypercalcemia- corrected calcium 10.4  Mild EMERY vs underlying CKD : stable     Hospital Course:   74 yr old male whose history includes gastric mass (GIST) initially diagnosed in 2022, HFpEF, CAD and HTN. Presented to [facility] on 3/1/25 with c/o chest and abdominal pain. Significantly hypertensive on arrival. Admitted for NSTEMI, hypertensive emergency and duodenal mass. CT abdomen/pelvis with contrast revealed an unchanged exophytic mass at the descending portion of the duodenum measuring 1.8 cm without obstruction and other no acute process.   Evaluated by cardiology. Records show he has known CAD by CCTA, PAD and HFpEF. ECHO revealed LVEF 50% with grade 1 diastolic dysfunction and no wall motion abnormalities. NSTEMI attributed to hypertensive emergency. Medications adjusted and deemed an adequate cardiac risk to proceed with endoscopy if needed.   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 Was planned to proceed with EGD and biopsy if cleared by cardiology. However, it was decided to transfer patient to tertiary center for further evaluation and treatment with surgical oncology.   Previous records reviewed. In July 2023 he saw hem/onc. At that time he'd not followed up with Dr. Maravilla as arranged and had missed multiple appts with GI, both due to transportation issues as his insurance would not cover his travel from [facility] to [place] for appointment. At that time periodic endoscopic surveillance and f/u with GI was recommended. Apparently he had previous colonoscopy but these records are not available for review.   Records show that at discharge he was tolerating clear liquids and experiencing on mild occasional abdominal discomfort.   The initial CT showing GIST noted size of tumor was 1.8 cm which is unchanged in size on CT A/P W CO done at [facility] 3/1/25.   Endorses unintentional weight loss, poor appetite.  Abdominal bloating and distention with minimal oral intake.     Seen by Dr Maravilla (surgical oncologyist). Will f/u on recommendations. As the mass has not changed in size and causing no obstruction no surgical intervention recommended.     He was seen by GI team and EGD done and showed   Impression:            EGD for dysphagia and epigastric pain                          - Normal esophagus. Suspect motility issue is                          etiology of any dysphagia                          - Gastritis. Biopsied.                          - Duodenal mass. Previously established to be                          GIST; follows with surgical oncoloy   Recommendation:        - [facility].                          - Resume previous diet.                          - Continue present medications.                          - Await pathology results.                          - Meds to consider for GI symptoms: bentyl,                          carafate, H2 blockers, PPIs, FDgard, IBgard                          - Return to GI clinic for next availability for                          continued work up and management of dysphagia                          - Ok for discharge from GI standpoint     Patient was continued on PO PPI and Carafate. He will f/u with Dr aMravilla as scheduled. Discharged home in a stable condigtion.      Pt was seen and examined on the day of discharge  Vitals:  VITAL SIGNS: 24 HRS MIN  & MAX LAST   Temp  Min: 96.8 °F (36 °C)  Max: 98.4 °F (36.9 °C) 98 °F (36.7 °C)   BP  Min: 126/70  Max: 199/94 (!) 163/80   Pulse  Min: 46  Max: 85  67   Resp  Min: 12  Max: 20 18   SpO2  Min: 94 %  Max: 98 % 97 %       Physical Exam:  Heart RRR  Lungs clear   Abdomen soft and non tender   Neuro: No FND      Procedures Performed: No admission procedures for hospital encounter.     Significant Diagnostic Studies: See Full reports for all details    Recent Labs   Lab 03/04/25 1915 03/06/25  0605   WBC 6.71 6.94   RBC 5.43 5.09   HGB 14.9 13.8*   HCT 44.1 41.2*   MCV 81.2 80.9   MCH 27.4 27.1   MCHC 33.8 33.5   RDW 16.3 15.7    355   MPV 10.7* 10.4       Recent Labs   Lab 03/04/25 1915 03/05/25  0716 03/06/25  0605    141 137   K 3.6 3.6 3.5    105 101   CO2 25 26 27   BUN 8.2* 8.6 10.2   CREATININE 1.42* 1.40* 1.50*   CALCIUM 10.6* 10.3* 9.7   MG 2.10 2.00  --    ALBUMIN 4.2 4.0  --    ALKPHOS 89 87  --    ALT 28 34  --    AST 32 34  --    BILITOT 1.0 1.0  --             No image results found.         Medication List        START taking these medications      sucralfate 1 gram tablet  Commonly known as: CARAFATE  Take 1 tablet (1 g total) by mouth 4 (four) times daily before meals and nightly. for 10 days            CONTINUE taking these medications      amLODIPine 10 MG tablet  Commonly known as: NORVASC     furosemide 20 MG tablet  Commonly known as: LASIX     isosorbide mononitrate 30 MG 24 hr tablet  Commonly known as: IMDUR     lisinopriL 40 MG tablet  Commonly known as: PRINIVIL,ZESTRIL     pantoprazole 40 MG tablet  Commonly known as: PROTONIX  Take 1 tablet (40 mg total) by mouth 2 (two) times daily.            STOP taking these medications      irbesartan 150 MG tablet  Commonly known as: AVAPRO               Where to Get Your Medications        These medications were sent to [facility] - [place], [place] - [address]  [address], [place] [address]       Phone: [phone]   pantoprazole 40 MG tablet  sucralfate 1 gram tablet          Explained in detail to the patient about the discharge plan, medications, and follow-up visits. Pt understands and agrees with the treatment plan  Discharge Disposition:  Home   Discharged Condition: stable  Diet-   Dietary Orders (From admission, onward)       Start     Ordered    03/07/25 1537  Diet Soft & Bite Sized (IDDSI Level 6)  Diet effective now         03/07/25 1536    03/05/25 1841  Dietary nutrition supplements TID; Boost Plus Nutritional Drink - Any flavor  Continuous        Question Answer Comment   Frequency: TID    Select PO Supplement: Boost Plus Nutritional Drink - Any flavor        03/05/25 1841                   Medications Per DC med rec  Activities as tolerated   Follow-up Information       Dav Maravilla MD Follow up in 1 month(s).    Specialty: Surgical Oncology  Why: Hospital Follow Up  Contact information:  [address]  [address] [place] [address]  [phone]                           For further questions contact hospitalist office    Discharge time 33 minutes    For worsening symptoms, chest pain, shortness of breath, increased abdominal pain, high grade fever, stroke or stroke like symptoms, immediately go to the nearest Emergency Room or call 911 as soon as possible.      Leighton Krishnamurthy M.D, on 3/7/2025. at 3:36 PM.

## 2025-03-08 NOTE — PLAN OF CARE
Problem: Adult Inpatient Plan of Care  Goal: Plan of Care Review  3/7/2025 1824 by Marcella Leyva RN  Outcome: Met  3/7/2025 1146 by Marcella Leyva RN  Outcome: Progressing  Goal: Patient-Specific Goal (Individualized)  3/7/2025 1824 by Marcella Leyva RN  Outcome: Met  3/7/2025 1146 by Marcella Leyva RN  Outcome: Progressing  Goal: Absence of Hospital-Acquired Illness or Injury  3/7/2025 1824 by Marcella Leyva RN  Outcome: Met  3/7/2025 1146 by Marcella Leyva RN  Outcome: Progressing  Goal: Optimal Comfort and Wellbeing  3/7/2025 1824 by Marcella Leyva RN  Outcome: Met  3/7/2025 1146 by Marcella Leyva RN  Outcome: Progressing  Goal: Readiness for Transition of Care  3/7/2025 1824 by Marcella Leyva RN  Outcome: Met  3/7/2025 1146 by Marcella Leyva RN  Outcome: Progressing

## 2025-03-10 ENCOUNTER — PATIENT OUTREACH (OUTPATIENT)
Dept: ADMINISTRATIVE | Facility: CLINIC | Age: 75
End: 2025-03-10
Payer: MEDICARE

## 2025-03-10 NOTE — PROGRESS NOTES
C3 nurse attempted to contact Davi Glaser for a TCC post hospital discharge follow up call. No answer. Voicemail left.  The patient does not have a scheduled HOSFU appointment, and the pt does not have an [facility] PCP.

## 2025-03-11 LAB — PSYCHE PATHOLOGY RESULT: NORMAL

## 2025-03-11 NOTE — PROGRESS NOTES
C3 nurse attempted to contact patient for a TCC post hospital discharge follow-up call. The patient initially agreed to the call but then later said goodbye during med reconciliation and hung up.  The patient declined call at this time.  The patient does not have a scheduled HOSFU appointment, and the [facility].

## 2025-03-13 ENCOUNTER — HOSPITAL ENCOUNTER (OUTPATIENT)
Facility: HOSPITAL | Age: 75
Discharge: HOME | End: 2025-03-15
Attending: STUDENT IN AN ORGANIZED HEALTH CARE EDUCATION/TRAINING PROGRAM | Admitting: INTERNAL MEDICINE
Payer: MEDICARE

## 2025-03-13 DIAGNOSIS — T78.40XA ALLERGIC REACTION, INITIAL ENCOUNTER: ICD-10-CM

## 2025-03-13 DIAGNOSIS — I21.4 NSTEMI (NON-ST ELEVATED MYOCARDIAL INFARCTION): Primary | ICD-10-CM

## 2025-03-13 DIAGNOSIS — R10.13 EPIGASTRIC ABDOMINAL PAIN: ICD-10-CM

## 2025-03-13 LAB
ALBUMIN SERPL-MCNC: 3.9 G/DL (ref 3.4–4.8)
ALBUMIN/GLOB SERPL: 1.3 RATIO (ref 1.1–2)
ALP SERPL-CCNC: 80 UNIT/L (ref 40–150)
ALT SERPL-CCNC: 34 UNIT/L (ref 0–55)
AMMONIA PLAS-MSCNC: 32 UMOL/L (ref 18–72)
ANION GAP SERPL CALC-SCNC: 5 MEQ/L
AST SERPL-CCNC: 20 UNIT/L (ref 5–34)
BACTERIA #/AREA URNS AUTO: NORMAL /HPF
BASOPHILS # BLD AUTO: 0.05 X10(3)/MCL
BASOPHILS NFR BLD AUTO: 0.7 %
BILIRUB SERPL-MCNC: 0.5 MG/DL
BILIRUB UR QL STRIP.AUTO: NEGATIVE
BNP BLD-MCNC: 212.2 PG/ML
BUN SERPL-MCNC: 12.1 MG/DL (ref 8.4–25.7)
CALCIUM SERPL-MCNC: 9.7 MG/DL (ref 8.8–10)
CHLORIDE SERPL-SCNC: 105 MMOL/L (ref 98–107)
CLARITY UR: CLEAR
CO2 SERPL-SCNC: 30 MMOL/L (ref 23–31)
COLOR UR AUTO: COLORLESS
CREAT SERPL-MCNC: 1.46 MG/DL (ref 0.72–1.25)
CREAT/UREA NIT SERPL: 8
EOSINOPHIL # BLD AUTO: 0.76 X10(3)/MCL (ref 0–0.9)
EOSINOPHIL NFR BLD AUTO: 10.1 %
ERYTHROCYTE [DISTWIDTH] IN BLOOD BY AUTOMATED COUNT: 15.8 % (ref 11.5–17)
GFR SERPLBLD CREATININE-BSD FMLA CKD-EPI: 50 ML/MIN/1.73/M2
GLOBULIN SER-MCNC: 3.1 GM/DL (ref 2.4–3.5)
GLUCOSE SERPL-MCNC: 76 MG/DL (ref 82–115)
GLUCOSE UR QL STRIP: NORMAL
HCT VFR BLD AUTO: 37.6 % (ref 42–52)
HGB BLD-MCNC: 12.6 G/DL (ref 14–18)
HGB UR QL STRIP: NEGATIVE
IMM GRANULOCYTES # BLD AUTO: 0.03 X10(3)/MCL (ref 0–0.04)
IMM GRANULOCYTES NFR BLD AUTO: 0.4 %
KETONES UR QL STRIP: NEGATIVE
LEUKOCYTE ESTERASE UR QL STRIP: NEGATIVE
LIPASE SERPL-CCNC: 9 U/L
LYMPHOCYTES # BLD AUTO: 2.32 X10(3)/MCL (ref 0.6–4.6)
LYMPHOCYTES NFR BLD AUTO: 30.8 %
MAGNESIUM SERPL-MCNC: 2.2 MG/DL (ref 1.6–2.6)
MCH RBC QN AUTO: 27.2 PG (ref 27–31)
MCHC RBC AUTO-ENTMCNC: 33.5 G/DL (ref 33–36)
MCV RBC AUTO: 81.2 FL (ref 80–94)
MONOCYTES # BLD AUTO: 0.69 X10(3)/MCL (ref 0.1–1.3)
MONOCYTES NFR BLD AUTO: 9.2 %
NEUTROPHILS # BLD AUTO: 3.69 X10(3)/MCL (ref 2.1–9.2)
NEUTROPHILS NFR BLD AUTO: 48.8 %
NITRITE UR QL STRIP: NEGATIVE
NRBC BLD AUTO-RTO: 0 %
PH UR STRIP: 6 [PH]
PLATELET # BLD AUTO: 411 X10(3)/MCL (ref 130–400)
PMV BLD AUTO: 11 FL (ref 7.4–10.4)
POTASSIUM SERPL-SCNC: 4.3 MMOL/L (ref 3.5–5.1)
PROT SERPL-MCNC: 7 GM/DL (ref 5.8–7.6)
PROT UR QL STRIP: NEGATIVE
RBC # BLD AUTO: 4.63 X10(6)/MCL (ref 4.7–6.1)
RBC #/AREA URNS AUTO: NORMAL /HPF
SODIUM SERPL-SCNC: 140 MMOL/L (ref 136–145)
SP GR UR STRIP.AUTO: 1.01 (ref 1–1.03)
SQUAMOUS #/AREA URNS LPF: NORMAL /HPF
TROPONIN I SERPL-MCNC: 0.05 NG/ML (ref 0–0.04)
TROPONIN I SERPL-MCNC: 0.06 NG/ML (ref 0–0.04)
UROBILINOGEN UR STRIP-ACNC: NORMAL
WBC # BLD AUTO: 7.54 X10(3)/MCL (ref 4.5–11.5)
WBC #/AREA URNS AUTO: NORMAL /HPF

## 2025-03-13 PROCEDURE — 96372 THER/PROPH/DIAG INJ SC/IM: CPT | Performed by: INTERNAL MEDICINE

## 2025-03-13 PROCEDURE — 25500020 PHARM REV CODE 255: Performed by: HOSPITALIST

## 2025-03-13 PROCEDURE — 84484 ASSAY OF TROPONIN QUANT: CPT | Performed by: INTERNAL MEDICINE

## 2025-03-13 PROCEDURE — 94640 AIRWAY INHALATION TREATMENT: CPT

## 2025-03-13 PROCEDURE — 25000003 PHARM REV CODE 250

## 2025-03-13 PROCEDURE — 93010 ELECTROCARDIOGRAM REPORT: CPT | Mod: ,,, | Performed by: INTERNAL MEDICINE

## 2025-03-13 PROCEDURE — G0378 HOSPITAL OBSERVATION PER HR: HCPCS

## 2025-03-13 PROCEDURE — 94760 N-INVAS EAR/PLS OXIMETRY 1: CPT

## 2025-03-13 PROCEDURE — 63600175 PHARM REV CODE 636 W HCPCS: Performed by: INTERNAL MEDICINE

## 2025-03-13 PROCEDURE — 25000003 PHARM REV CODE 250: Performed by: INTERNAL MEDICINE

## 2025-03-13 PROCEDURE — 25000003 PHARM REV CODE 250: Performed by: NURSE PRACTITIONER

## 2025-03-13 PROCEDURE — 85025 COMPLETE CBC W/AUTO DIFF WBC: CPT | Performed by: PHYSICIAN ASSISTANT

## 2025-03-13 PROCEDURE — 83735 ASSAY OF MAGNESIUM: CPT | Performed by: PHYSICIAN ASSISTANT

## 2025-03-13 PROCEDURE — 63600175 PHARM REV CODE 636 W HCPCS: Mod: JZ,TB

## 2025-03-13 PROCEDURE — 25000242 PHARM REV CODE 250 ALT 637 W/ HCPCS

## 2025-03-13 PROCEDURE — 96372 THER/PROPH/DIAG INJ SC/IM: CPT | Mod: 59

## 2025-03-13 PROCEDURE — 99900031 HC PATIENT EDUCATION (STAT)

## 2025-03-13 PROCEDURE — 99285 EMERGENCY DEPT VISIT HI MDM: CPT | Mod: 25

## 2025-03-13 PROCEDURE — 11000001 HC ACUTE MED/SURG PRIVATE ROOM

## 2025-03-13 PROCEDURE — 82140 ASSAY OF AMMONIA: CPT | Performed by: PHYSICIAN ASSISTANT

## 2025-03-13 PROCEDURE — 36415 COLL VENOUS BLD VENIPUNCTURE: CPT | Performed by: INTERNAL MEDICINE

## 2025-03-13 PROCEDURE — 80053 COMPREHEN METABOLIC PANEL: CPT | Performed by: PHYSICIAN ASSISTANT

## 2025-03-13 PROCEDURE — 36415 COLL VENOUS BLD VENIPUNCTURE: CPT | Performed by: PHYSICIAN ASSISTANT

## 2025-03-13 PROCEDURE — 83690 ASSAY OF LIPASE: CPT | Performed by: PHYSICIAN ASSISTANT

## 2025-03-13 PROCEDURE — 83880 ASSAY OF NATRIURETIC PEPTIDE: CPT

## 2025-03-13 PROCEDURE — 93005 ELECTROCARDIOGRAM TRACING: CPT

## 2025-03-13 PROCEDURE — 81001 URINALYSIS AUTO W/SCOPE: CPT | Performed by: PHYSICIAN ASSISTANT

## 2025-03-13 PROCEDURE — 84484 ASSAY OF TROPONIN QUANT: CPT

## 2025-03-13 RX ORDER — AMLODIPINE BESYLATE 5 MG/1
10 TABLET ORAL DAILY
Status: DISCONTINUED | OUTPATIENT
Start: 2025-03-13 | End: 2025-03-15 | Stop reason: HOSPADM

## 2025-03-13 RX ORDER — ISOSORBIDE MONONITRATE 30 MG/1
30 TABLET, EXTENDED RELEASE ORAL DAILY
Status: DISCONTINUED | OUTPATIENT
Start: 2025-03-13 | End: 2025-03-15 | Stop reason: HOSPADM

## 2025-03-13 RX ORDER — ENOXAPARIN SODIUM 100 MG/ML
40 INJECTION SUBCUTANEOUS EVERY 24 HOURS
Status: DISCONTINUED | OUTPATIENT
Start: 2025-03-13 | End: 2025-03-15 | Stop reason: HOSPADM

## 2025-03-13 RX ORDER — LABETALOL HYDROCHLORIDE 5 MG/ML
20 INJECTION, SOLUTION INTRAVENOUS EVERY 4 HOURS PRN
Status: DISCONTINUED | OUTPATIENT
Start: 2025-03-13 | End: 2025-03-15 | Stop reason: HOSPADM

## 2025-03-13 RX ORDER — ONDANSETRON HYDROCHLORIDE 2 MG/ML
4 INJECTION, SOLUTION INTRAVENOUS EVERY 8 HOURS PRN
Status: DISCONTINUED | OUTPATIENT
Start: 2025-03-13 | End: 2025-03-15 | Stop reason: HOSPADM

## 2025-03-13 RX ORDER — METHYLPREDNISOLONE SOD SUCC 125 MG
125 VIAL (EA) INJECTION
Status: COMPLETED | OUTPATIENT
Start: 2025-03-13 | End: 2025-03-13

## 2025-03-13 RX ORDER — SODIUM CHLORIDE 0.9 % (FLUSH) 0.9 %
10 SYRINGE (ML) INJECTION
Status: DISCONTINUED | OUTPATIENT
Start: 2025-03-13 | End: 2025-03-15 | Stop reason: HOSPADM

## 2025-03-13 RX ORDER — LEVALBUTEROL INHALATION SOLUTION 1.25 MG/3ML
1.25 SOLUTION RESPIRATORY (INHALATION)
Status: COMPLETED | OUTPATIENT
Start: 2025-03-13 | End: 2025-03-13

## 2025-03-13 RX ORDER — TALC
6 POWDER (GRAM) TOPICAL NIGHTLY PRN
Status: DISCONTINUED | OUTPATIENT
Start: 2025-03-13 | End: 2025-03-15 | Stop reason: HOSPADM

## 2025-03-13 RX ORDER — CETIRIZINE HYDROCHLORIDE 10 MG/1
10 TABLET ORAL
Status: COMPLETED | OUTPATIENT
Start: 2025-03-13 | End: 2025-03-13

## 2025-03-13 RX ORDER — ACETAMINOPHEN 325 MG/1
650 TABLET ORAL EVERY 8 HOURS PRN
Status: DISCONTINUED | OUTPATIENT
Start: 2025-03-13 | End: 2025-03-15 | Stop reason: HOSPADM

## 2025-03-13 RX ORDER — CLOPIDOGREL BISULFATE 75 MG/1
75 TABLET ORAL DAILY
Status: DISCONTINUED | OUTPATIENT
Start: 2025-03-13 | End: 2025-03-15 | Stop reason: HOSPADM

## 2025-03-13 RX ORDER — ASPIRIN 81 MG/1
81 TABLET ORAL DAILY
Status: DISCONTINUED | OUTPATIENT
Start: 2025-03-13 | End: 2025-03-13

## 2025-03-13 RX ORDER — HYDRALAZINE HYDROCHLORIDE 20 MG/ML
20 INJECTION INTRAMUSCULAR; INTRAVENOUS EVERY 4 HOURS PRN
Status: DISCONTINUED | OUTPATIENT
Start: 2025-03-13 | End: 2025-03-15 | Stop reason: HOSPADM

## 2025-03-13 RX ORDER — CLONIDINE HYDROCHLORIDE 0.2 MG/1
0.2 TABLET ORAL
Status: COMPLETED | OUTPATIENT
Start: 2025-03-13 | End: 2025-03-13

## 2025-03-13 RX ORDER — LISINOPRIL 20 MG/1
40 TABLET ORAL DAILY
Status: DISCONTINUED | OUTPATIENT
Start: 2025-03-13 | End: 2025-03-15 | Stop reason: HOSPADM

## 2025-03-13 RX ORDER — DIPHENHYDRAMINE HCL 25 MG
25 CAPSULE ORAL EVERY 6 HOURS PRN
Status: DISCONTINUED | OUTPATIENT
Start: 2025-03-13 | End: 2025-03-15 | Stop reason: HOSPADM

## 2025-03-13 RX ADMIN — AMLODIPINE BESYLATE 10 MG: 5 TABLET ORAL at 07:03

## 2025-03-13 RX ADMIN — ENOXAPARIN SODIUM 40 MG: 40 INJECTION SUBCUTANEOUS at 09:03

## 2025-03-13 RX ADMIN — LEVALBUTEROL HYDROCHLORIDE 1.25 MG: 1.25 SOLUTION RESPIRATORY (INHALATION) at 05:03

## 2025-03-13 RX ADMIN — ISOSORBIDE MONONITRATE 30 MG: 30 TABLET, EXTENDED RELEASE ORAL at 07:03

## 2025-03-13 RX ADMIN — CLOPIDOGREL 75 MG: 75 TABLET ORAL at 07:03

## 2025-03-13 RX ADMIN — DIPHENHYDRAMINE HYDROCHLORIDE 25 MG: 25 CAPSULE ORAL at 11:03

## 2025-03-13 RX ADMIN — CETIRIZINE HYDROCHLORIDE 10 MG: 10 TABLET, FILM COATED ORAL at 03:03

## 2025-03-13 RX ADMIN — IOHEXOL 100 ML: 350 INJECTION, SOLUTION INTRAVENOUS at 06:03

## 2025-03-13 RX ADMIN — LISINOPRIL 40 MG: 20 TABLET ORAL at 07:03

## 2025-03-13 RX ADMIN — CLONIDINE HYDROCHLORIDE 0.2 MG: 0.2 TABLET ORAL at 06:03

## 2025-03-13 RX ADMIN — METHYLPREDNISOLONE SODIUM SUCCINATE 125 MG: 125 INJECTION, POWDER, FOR SOLUTION INTRAMUSCULAR; INTRAVENOUS at 03:03

## 2025-03-13 NOTE — ED PROVIDER NOTES
Encounter Date: 3/13/2025       History     Chief Complaint   Patient presents with    Abdominal Pain     C/o abdominal swelling and pain, swelling and itching to left side of face, patient stated pcp told to come back to the ER     74 y.o.  male with a history of GERD, Hyperlipidemia, and Hypertension presents to  Emergency Department with a chief complaint of abdominal pain. Symptoms began several days ago and have been constant since onset. Patient to see GI and surgical oncology specialist outpatient for removal of mass. Low suspicion for malignancy, mass has been unchanged for several years. Associated symptoms include pruritic rash to neck. Patient thinks Carafate may have caused rash. Symptoms are aggravated with palpation and there are no alleviating factors. The patient denies CP, SOB, fever, chills, dizziness, constipation, or vomiting. No other reported symptoms at this time. LBM: today.       The history is provided by the patient. No  was used.   Abdominal Pain  The current episode started several days ago. The onset of the illness was abrupt. The problem has not changed since onset.The abdominal pain is located in the epigastric region. The abdominal pain does not radiate. The other symptoms of the illness do not include fever, shortness of breath, nausea, vomiting, diarrhea or dysuria.   The patient has not had a change in bowel habit. Risk factors for an acute abdominal problem include being elderly. Symptoms associated with the illness do not include chills, diaphoresis, heartburn, constipation, urgency, frequency or back pain.     Review of patient's allergies indicates:   Allergen Reactions    Nsaids (non-steroidal anti-inflammatory drug) Other (See Comments)     Patient reports not being able to take these and he feels bad when he takes this and does not feel right.       Ibuprofen Nausea Only     Past Medical History:   Diagnosis Date    GERD (gastroesophageal  reflux disease)     H. pylori infection     HLD (hyperlipidemia)     Hypertension      Past Surgical History:   Procedure Laterality Date    APPENDECTOMY      EGD, WITH CLOSED BIOPSY N/A 3/7/2025    Procedure: EGD, WITH CLOSED BIOPSY;  Surgeon: Lauri Gupta MD;  Location: [facility] ENDOSCOPY;  Service: Endoscopy;  Laterality: N/A;     Family History   Problem Relation Name Age of Onset    Breast cancer Sister       Social History[1]  Review of Systems   Constitutional:  Negative for chills, diaphoresis and fever.   Eyes:  Negative for photophobia and visual disturbance.   Respiratory:  Negative for cough, shortness of breath, wheezing and stridor.    Cardiovascular:  Negative for chest pain and leg swelling.   Gastrointestinal:  Positive for abdominal pain. Negative for constipation, diarrhea, heartburn, nausea and vomiting.   Genitourinary:  Negative for dysuria, flank pain, frequency and urgency.   Musculoskeletal:  Negative for back pain.   Skin:  Positive for color change and rash. Negative for wound.   All other systems reviewed and are negative.      Physical Exam     Initial Vitals [03/13/25 1130]   BP Pulse Resp Temp SpO2   (!) 160/82 60 16 97.7 °F (36.5 °C) 98 %      MAP       --         Physical Exam    Nursing note and vitals reviewed.  Constitutional: He appears well-developed and well-nourished. He is not diaphoretic. He is cooperative.  Non-toxic appearance. No distress.   HENT:   Head: Normocephalic and atraumatic.   Right Ear: External ear normal.   Left Ear: External ear normal.   Nose: Nose normal.   Eyes: Conjunctivae and EOM are normal. Pupils are equal, round, and reactive to light.   Neck: Neck supple.   Normal range of motion.  Cardiovascular:  Normal rate, regular rhythm, S1 normal, S2 normal, normal heart sounds, intact distal pulses and normal pulses.           Pulmonary/Chest: Effort normal and breath sounds normal. No tachypnea and no bradypnea. No respiratory distress. He has no  decreased breath sounds. He has no wheezes. He has no rhonchi. He has no rales. He exhibits no tenderness.   Abdominal: Abdomen is soft. Bowel sounds are normal. He exhibits no distension. There is abdominal tenderness in the epigastric area.   Abdomen is soft. No guarding.  There is no rebound and no guarding.   Musculoskeletal:         General: Normal range of motion.      Cervical back: Normal range of motion and neck supple.     Neurological: He is alert and oriented to person, place, and time. He has normal strength. No sensory deficit. GCS score is 15. GCS eye subscore is 4. GCS verbal subscore is 5. GCS motor subscore is 6.   Skin: Skin is warm and dry. Capillary refill takes less than 2 seconds. Rash noted. Rash is urticarial.   Rash noted to patient's neck. No weeping or drainage noted. No signs of infection noted.    Psychiatric: He has a normal mood and affect. Thought content normal.         ED Course   Procedures  Labs Reviewed   COMPREHENSIVE METABOLIC PANEL - Abnormal       Result Value    Sodium 140      Potassium 4.3      Chloride 105      CO2 30      Glucose 76 (*)     Blood Urea Nitrogen 12.1      Creatinine 1.46 (*)     Calcium 9.7      Protein Total 7.0      Albumin 3.9      Globulin 3.1      Albumin/Globulin Ratio 1.3      Bilirubin Total 0.5      ALP 80      ALT 34      AST 20      eGFR 50      Anion Gap 5.0      BUN/Creatinine Ratio 8     CBC WITH DIFFERENTIAL - Abnormal    WBC 7.54      RBC 4.63 (*)     Hgb 12.6 (*)     Hct 37.6 (*)     MCV 81.2      MCH 27.2      MCHC 33.5      RDW 15.8      Platelet 411 (*)     MPV 11.0 (*)     Neut % 48.8      Lymph % 30.8      Mono % 9.2      Eos % 10.1      Basophil % 0.7      Imm Grans % 0.4      Neut # 3.69      Lymph # 2.32      Mono # 0.69      Eos # 0.76      Baso # 0.05      Imm Gran # 0.03      NRBC% 0.0     TROPONIN I - Abnormal    Troponin-I 0.064 (*)    B-TYPE NATRIURETIC PEPTIDE - Abnormal    Natriuretic Peptide 212.2 (*)    AMMONIA - Normal     Ammonia Level 32.0     URINALYSIS, REFLEX TO URINE CULTURE - Normal    Color, UA Colorless      Appearance, UA Clear      Specific Gravity, UA 1.007      pH, UA 6.0      Protein, UA Negative      Glucose, UA Normal      Ketones, UA Negative      Blood, UA Negative      Bilirubin, UA Negative      Urobilinogen, UA Normal      Nitrites, UA Negative      Leukocyte Esterase, UA Negative      RBC, UA 0-5      WBC, UA None Seen      Bacteria, UA None Seen      Squamous Epithelial Cells, UA None Seen     LIPASE - Normal    Lipase Level 9     MAGNESIUM - Normal    Magnesium Level 2.20     CBC W/ AUTO DIFFERENTIAL    Narrative:     The following orders were created for panel order CBC auto differential.  Procedure                               Abnormality         Status                     ---------                               -----------         ------                     CBC with Differential[[id number]]       Abnormal            Final result                 Please view results for these tests on the individual orders.     EKG Readings: (Independently Interpreted)   Initial Reading: No STEMI. Rhythm: Normal Sinus Rhythm. Heart Rate: 56. Conduction: 1st Degree AV Block.       Imaging Results    None          Medications   amLODIPine tablet 10 mg (has no administration in time range)   isosorbide mononitrate 24 hr tablet 30 mg (has no administration in time range)   lisinopriL tablet 40 mg (has no administration in time range)   hydrALAZINE injection 20 mg (has no administration in time range)   labetaloL injection 20 mg (has no administration in time range)   aspirin EC tablet 81 mg (has no administration in time range)   cetirizine tablet 10 mg (10 mg Oral Given 3/13/25 1559)   methylPREDNISolone sodium succinate injection 125 mg (125 mg Intramuscular Given 3/13/25 1558)   levalbuterol nebulizer solution 1.25 mg (1.25 mg Nebulization Given 3/13/25 1708)   cloNIDine tablet 0.2 mg (0.2 mg Oral Given 3/13/25 2173)      Medical Decision Making  Patient awake, alert, has non-labored breathing, and follows commands appropriately. Arrived to ED due to epigastric abdominal pain and rash. Patient reports abdominal pain has been ongoing. States rash began on yesterday. Thinks Carafate may be causing symptoms. Afebrile. NAD Noted.     Judging by the patient's chief complaint and pertinent history, the patient has the following possible differential diagnoses, including but not limited to the following: Allergic Reaction, CP, NSTEMI, Duodenal Mass     Some of these are deemed to be lower likelihood and some more likely based on my physical exam and history combined with possible lab work and/or imaging studies. Please see the pertinent studies, and refer to the HPI. Some of these diagnoses will take further evaluation to fully rule out, perhaps as an outpatient and the patient was encouraged to follow up when discharged for more comprehensive evaluation.       Amount and/or Complexity of Data Reviewed  Labs: ordered. Decision-making details documented in ED Course.     Details: No leukocytosis noted. Elevated troponin level. BNP mildly elevated. Informed patient of results.   Radiology: ordered.  ECG/medicine tests: ordered.  Discussion of management or test interpretation with external provider(s): Due to patient's ongoing symptoms and elevated troponin level, will admit to  services with cardiology consult. Discussed plan of care and interventions with patient. Agreed to and aware of plan of care. Comfortable being admitted.     Risk  Decision regarding hospitalization.               ED Course as of 03/13/25 1843   Thu Mar 13, 2025   1515 WBC: 7.54 [JA]   1515 Lipase: 9 [JA]   1515 Magnesium : 2.20 [JA]   1515 Ammonia: 32.0 [JA]   1516 Creatinine(!): 1.46  Trending near baseline.  [JA]   1531 Chart review reveals patient hospitalized on 03/01/25 at [facility] for abdominal pain. Transferred to [facility] for further evaluation of  abdominal mass. Hx of unchanged duodenal mass. Surgical oncology and GI services evaluated patient. EGD performed. Removal of mass to be scheduled outpatient.  [JA]   1650 Discussed patient with Dr. Cat who assessed the patient at bedside. Plan to admit, patient now has wheezing. Will order BNP and give breathing treatment.  [JA]   1732 Discussed patient with KIAH Riley, with [facility]. Will admit to services with cardiology consult. No further instruction or recommendation given.  [JA]   1813 BNP(!): 212.2 [JA]   1818 BP(!): 194/91  Patient missed evening dose of Clonidine.  [JA]   1841 Discussed patient with Nam, with [facility]. Will consult services. No further instruction or recommendations given.  [JA]      ED Course User Index  [JA] Louise León NP                           Clinical Impression:  Final diagnoses:  [R10.13] Epigastric abdominal pain  [I21.4] NSTEMI (non-ST elevated myocardial infarction) (Primary)  [T78.40XA] Allergic reaction, initial encounter          ED Disposition Condition    Admit Stable                    [1]   Social History  Tobacco Use    Smoking status: Every Day     Current packs/day: 0.05     Average packs/day: 0.1 packs/day for 50.0 years (2.5 ttl pk-yrs)     Types: Cigarettes    Smokeless tobacco: Never   Substance Use Topics    Alcohol use: Not Currently    Drug use: Not Currently        Louise León NP  03/13/25 [id number]

## 2025-03-13 NOTE — Clinical Note
Diagnosis: Epigastric abdominal pain [[id number]]   Future Attending Provider: HOLLIE SCOTT [[id number]]   Admit to which facility:: [facility] [[id number]]   Reason for IP Medical Treatment  (Clinical interventions that can only be accomplished in the IP setting? ) :: evaluation and treatment of symptoms.   Plans for Post-Acute care--if anticipated (pick the single best option):: A. No post acute care anticipated at this time   Special Needs:: No Special Needs [1]

## 2025-03-13 NOTE — FIRST PROVIDER EVALUATION
"Medical screening examination initiated.  I have conducted a focused provider triage encounter, findings are as follows:    Brief history of present illness:  74-year-old male presents to ED for evaluation of generalized abdominal pain.  Patient reports a mass in his stomach.  Reports recently discharged and he is still having severe pain prompting him to return to ED.  Complains of nausea without vomiting    Vitals:    03/13/25 1130   BP: (!) 160/82   Pulse: 60   Resp: 16   Temp: 97.7 °F (36.5 °C)   TempSrc: Temporal   SpO2: 98%   Weight: 95.3 kg (210 lb)   Height: 6' 5" (1.956 m)       Pertinent physical exam:  Patient is awake and alert and oriented.  Ambulatory to triage.  In no acute distress.      Brief workup plan:  labs, UA, IV    Preliminary workup initiated; this workup will be continued and followed by the physician or advanced practice provider that is assigned to the patient when roomed.  "

## 2025-03-14 PROBLEM — I10 PRIMARY HYPERTENSION: Status: ACTIVE | Noted: 2025-03-14

## 2025-03-14 LAB
ALBUMIN SERPL-MCNC: 3.8 G/DL (ref 3.4–4.8)
ALBUMIN/GLOB SERPL: 1.3 RATIO (ref 1.1–2)
ALP SERPL-CCNC: 82 UNIT/L (ref 40–150)
ALT SERPL-CCNC: 30 UNIT/L (ref 0–55)
ANION GAP SERPL CALC-SCNC: 9 MEQ/L
AST SERPL-CCNC: 19 UNIT/L (ref 5–34)
BASOPHILS # BLD AUTO: 0.01 X10(3)/MCL
BASOPHILS NFR BLD AUTO: 0.1 %
BILIRUB SERPL-MCNC: 0.5 MG/DL
BUN SERPL-MCNC: 14.6 MG/DL (ref 8.4–25.7)
CALCIUM SERPL-MCNC: 9.8 MG/DL (ref 8.8–10)
CHLORIDE SERPL-SCNC: 104 MMOL/L (ref 98–107)
CO2 SERPL-SCNC: 26 MMOL/L (ref 23–31)
CREAT SERPL-MCNC: 1.41 MG/DL (ref 0.72–1.25)
CREAT/UREA NIT SERPL: 10
EOSINOPHIL # BLD AUTO: 0.01 X10(3)/MCL (ref 0–0.9)
EOSINOPHIL NFR BLD AUTO: 0.1 %
ERYTHROCYTE [DISTWIDTH] IN BLOOD BY AUTOMATED COUNT: 15.5 % (ref 11.5–17)
GFR SERPLBLD CREATININE-BSD FMLA CKD-EPI: 52 ML/MIN/1.73/M2
GLOBULIN SER-MCNC: 3 GM/DL (ref 2.4–3.5)
GLUCOSE SERPL-MCNC: 154 MG/DL (ref 82–115)
HCT VFR BLD AUTO: 38.6 % (ref 42–52)
HGB BLD-MCNC: 12.9 G/DL (ref 14–18)
IMM GRANULOCYTES # BLD AUTO: 0.02 X10(3)/MCL (ref 0–0.04)
IMM GRANULOCYTES NFR BLD AUTO: 0.3 %
LYMPHOCYTES # BLD AUTO: 1.05 X10(3)/MCL (ref 0.6–4.6)
LYMPHOCYTES NFR BLD AUTO: 13.4 %
MCH RBC QN AUTO: 26.8 PG (ref 27–31)
MCHC RBC AUTO-ENTMCNC: 33.4 G/DL (ref 33–36)
MCV RBC AUTO: 80.2 FL (ref 80–94)
MONOCYTES # BLD AUTO: 0.08 X10(3)/MCL (ref 0.1–1.3)
MONOCYTES NFR BLD AUTO: 1 %
NEUTROPHILS # BLD AUTO: 6.64 X10(3)/MCL (ref 2.1–9.2)
NEUTROPHILS NFR BLD AUTO: 85.1 %
NRBC BLD AUTO-RTO: 0 %
OHS QRS DURATION: 102 MS
OHS QTC CALCULATION: 416 MS
PLATELET # BLD AUTO: 418 X10(3)/MCL (ref 130–400)
PMV BLD AUTO: 10.9 FL (ref 7.4–10.4)
POTASSIUM SERPL-SCNC: 4.9 MMOL/L (ref 3.5–5.1)
PROT SERPL-MCNC: 6.8 GM/DL (ref 5.8–7.6)
RBC # BLD AUTO: 4.81 X10(6)/MCL (ref 4.7–6.1)
SODIUM SERPL-SCNC: 139 MMOL/L (ref 136–145)
TROPONIN I SERPL-MCNC: 0.04 NG/ML (ref 0–0.04)
WBC # BLD AUTO: 7.81 X10(3)/MCL (ref 4.5–11.5)

## 2025-03-14 PROCEDURE — 96374 THER/PROPH/DIAG INJ IV PUSH: CPT

## 2025-03-14 PROCEDURE — 80053 COMPREHEN METABOLIC PANEL: CPT | Performed by: INTERNAL MEDICINE

## 2025-03-14 PROCEDURE — 63600175 PHARM REV CODE 636 W HCPCS: Mod: JZ,TB | Performed by: INTERNAL MEDICINE

## 2025-03-14 PROCEDURE — 96372 THER/PROPH/DIAG INJ SC/IM: CPT | Performed by: INTERNAL MEDICINE

## 2025-03-14 PROCEDURE — 63600175 PHARM REV CODE 636 W HCPCS: Performed by: INTERNAL MEDICINE

## 2025-03-14 PROCEDURE — G0378 HOSPITAL OBSERVATION PER HR: HCPCS

## 2025-03-14 PROCEDURE — 84484 ASSAY OF TROPONIN QUANT: CPT | Performed by: INTERNAL MEDICINE

## 2025-03-14 PROCEDURE — 25000003 PHARM REV CODE 250: Performed by: NURSE PRACTITIONER

## 2025-03-14 PROCEDURE — 36415 COLL VENOUS BLD VENIPUNCTURE: CPT | Performed by: INTERNAL MEDICINE

## 2025-03-14 PROCEDURE — 85025 COMPLETE CBC W/AUTO DIFF WBC: CPT | Performed by: INTERNAL MEDICINE

## 2025-03-14 RX ORDER — FUROSEMIDE 10 MG/ML
20 INJECTION INTRAMUSCULAR; INTRAVENOUS ONCE
Status: COMPLETED | OUTPATIENT
Start: 2025-03-14 | End: 2025-03-14

## 2025-03-14 RX ADMIN — ENOXAPARIN SODIUM 40 MG: 40 INJECTION SUBCUTANEOUS at 04:03

## 2025-03-14 RX ADMIN — LISINOPRIL 40 MG: 20 TABLET ORAL at 08:03

## 2025-03-14 RX ADMIN — CLOPIDOGREL 75 MG: 75 TABLET ORAL at 08:03

## 2025-03-14 RX ADMIN — ISOSORBIDE MONONITRATE 30 MG: 30 TABLET, EXTENDED RELEASE ORAL at 08:03

## 2025-03-14 RX ADMIN — FUROSEMIDE 20 MG: 10 INJECTION, SOLUTION INTRAVENOUS at 03:03

## 2025-03-14 RX ADMIN — AMLODIPINE BESYLATE 10 MG: 5 TABLET ORAL at 08:03

## 2025-03-14 RX ADMIN — METHYLPREDNISOLONE SODIUM SUCCINATE 60 MG: 40 INJECTION, POWDER, FOR SOLUTION INTRAMUSCULAR; INTRAVENOUS at 05:03

## 2025-03-14 NOTE — H&P
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  Tonight he presents to the ER complaining of persistent epigastric abdominal pain as well as a new rash to his neck he believes maybe related to his medications.  Denies fever chills or chest pain.    He arrived to the ER afebrile but hypertensive with a systolic in the 190s, hemodynamically stable maintaining normal sats on room air.  EKG noted sinus bradycardia with a first-degree AV block.  Laboratory work noted a troponin of 0.064 and a mildly elevated beta BNP of 212 (note recent echo showed normal EF and grade 1 DD this month).  Consultation was placed to Cardiology, patient was given Solu-Medrol and medications to lower blood pressure and admitted to the hospitalist service for further management.    PAST MEDICAL HISTORY:     Past Medical History:   Diagnosis Date    GERD (gastroesophageal reflux disease)     H. pylori infection     HLD (hyperlipidemia)     Hypertension        PAST SURGICAL HISTORY:     Past Surgical History:   Procedure Laterality Date    APPENDECTOMY      EGD, WITH CLOSED BIOPSY N/A 3/7/2025    Procedure: EGD, WITH CLOSED BIOPSY;   Surgeon: Lauri Gupta MD;  Location: [facility] ENDOSCOPY;  Service: Endoscopy;  Laterality: N/A;       ALLERGIES:   Aspirin, Nsaids (non-steroidal anti-inflammatory drug), and Ibuprofen    FAMILY HISTORY:   Reviewed and non-contributory     SOCIAL HISTORY:   Screening for Social Drivers for health: Patient screened for food insecurity, housing instability, transportation needs, utility   difficulties, and interpersonal safety (select all that apply as identified as concern)  []Housing or Food  []Transportation Needs  []Utility Difficulties  []Interpersonal safety  [x]None  Social History     Tobacco Use    Smoking status: Every Day     Current packs/day: 0.05     Average packs/day: 0.1 packs/day for 50.0 years (2.5 ttl pk-yrs)     Types: Cigarettes    Smokeless tobacco: Never   Substance Use Topics    Alcohol use: Not Currently        HOME MEDICATIONS:     Prior to Admission medications    Medication Sig Start Date End Date Taking? Authorizing Provider   amLODIPine (NORVASC) 10 MG tablet Take 10 mg by mouth once daily. 4/28/21   Provider, Historical   furosemide (LASIX) 20 MG tablet Take 20 mg by mouth once daily.    Provider, Historical   isosorbide mononitrate (IMDUR) 30 MG 24 hr tablet Take 30 mg by mouth once daily.    Provider, Historical   lisinopriL (PRINIVIL,ZESTRIL) 40 MG tablet Take 40 mg by mouth once daily. 4/28/21   Provider, Historical   pantoprazole (PROTONIX) 40 MG tablet Take 1 tablet (40 mg total) by mouth 2 (two) times daily.  Patient not taking: Reported on 3/11/2025 3/7/25 6/5/25  Leighton Branch MD   sucralfate (CARAFATE) 1 gram tablet Take 1 tablet (1 g total) by mouth 4 (four) times daily before meals and nightly. for 10 days 3/7/25 3/17/25  Leighton Branch MD       REVIEW OF SYSTEMS:   Except as documented, all other systems reviewed and negative     PHYSICAL EXAM:   T 97.4 °F (36.3 °C)   BP (!) 175/84   P (!) 57   RR 20   O2 96 %  GENERAL: awake, alert, oriented and in  "no acute distress, non-toxic appearing   HEENT: normocephalic atraumatic   NECK: supple   LUNGS: Clear bilaterally, no wheezing or rales, no accessory muscle use   CVS: Regular rate and rhythm, normal peripheral perfusion  ABD: Soft, non-tender, non-distended, bowel sounds present  EXTREMITIES: no clubbing or cyanosis  SKIN: Warm, dry.  Minimal erythema around the neck hardly currently visible  NEURO: alert and oriented, grossly without focal deficits   PSYCHIATRIC: Cooperative    LABS AND IMAGING:     Recent Labs     03/13/25  1224   WBC 7.54   RBC 4.63*   HGB 12.6*   HCT 37.6*   MCV 81.2   MCH 27.2   MCHC 33.5   RDW 15.8   *     No results for input(s): "LACTIC" in the last 72 hours.  No results for input(s): "INR", "APTT", "D-DIMER" in the last 72 hours.  No results for input(s): "HGBA1C", "CHOL", "TRIG", "LDL", "VLDL", "HDL" in the last 72 hours.   Recent Labs     03/13/25  1158      K 4.3   CO2 30   BUN 12.1   CREATININE 1.46*   GLUCOSE 76*   CALCIUM 9.7   MG 2.20   ALBUMIN 3.9   GLOBULIN 3.1   ALKPHOS 80   ALT 34   AST 20   BILITOT 0.5   LIPASE 9     Recent Labs     03/13/25  1158 03/13/25  1725   BNP  --  212.2*   TROPONINI 0.064*  --           CTA Chest Non-Coronary (PE Studies)  Narrative: EXAMINATION:  CTA CHEST NON CORONARY (PE STUDIES)    CLINICAL HISTORY:  Pulmonary embolism (PE) suspected, unknown D-dimer;   chest pain    TECHNIQUE:  Helically acquired images with axial, sagittal and coronal reformations were obtained from the thoracic inlet to the lung bases followingthe IV administration of contrast.  CTA timed for evaluation of the pulmonary arteries.  MIP images were performed.    Automated tube current modulation, weight-based exposure dosing, and/or iterative reconstruction technique utilized to reach lowest reasonably achievable exposure rate.    DLP: 425 mGy*cm    COMPARISON:  No relevant priors available for comparison at time of this dictation    FINDINGS:  BASE OF NECK: No " significant abnormality.    AORTA: Aortic atherosclerosis.    PULMONARY VASCULATURE: Pulmonary arteries enhance normally.  No evidence of pulmonary embolus. Bolus timing insufficient for definitive exclusion of small peripheral pulmonary embolism.    HEART: Normal size. No effusion.    MALA/MEDIASTINUM: No enlarged lymph nodes by size criteria.    AIRWAYS: Patent.    LUNGS/PLEURA: Clear lungs. No pleural effusion or thickening.    UPPER ABDOMEN: Lobulated renal contour.  This limits evaluation for underlying mass    THORACIC SOFT TISSUES: Unremarkable.    BONES: Flowing osteophytes of the thoracic spine as can be seen with DISH.  Impression: 1. No evidence of pulmonary embolus.    Electronically signed by: Ayla Colorado  Date:    03/13/2025  Time:    19:09      ASSESSMENT & PLAN:   Hypertensive urgency   NSTEMI likely type 2 secondary to above   Allergic reaction/urticaria    Hx: HTN, GIST tumor, GERD     -tele monitoring and trend cardiac enzymes   -resume home antihypertensives and as needed IV antihypertensives   -Benadryl as needed for urticaria (patient believes his Carafate as the cause and can not be convinced otherwise so he was told to stop this medication)  -likely home in a.m. if blood pressure improved    DVT prophylaxis:  Lovenox  Code status:  Full code    If patient was admitted under observational status it is with my approval/permission.     At least 55 min was spent on this history and physical.  Time seen:  11:00 p.m. 3/13  Critical care time = 35 min; Critical care diagnosis = hypertensive urgency/IV antihypertensives   Fam Alarcon MD

## 2025-03-14 NOTE — ASSESSMENT & PLAN NOTE
Patient's blood pressure range in the last 24 hours was: BP  Min: 126/66  Max: 204/97.The patient's inpatient anti-hypertensive regimen is listed below:  Current Antihypertensives  amLODIPine tablet 10 mg, Daily, Oral  isosorbide mononitrate 24 hr tablet 30 mg, Daily, Oral  lisinopriL tablet 40 mg, Daily, Oral  hydrALAZINE injection 20 mg, Every 4 hours PRN, Intravenous  labetaloL injection 20 mg, Every 4 hours PRN, Intravenous    Plan  - BP is uncontrolled, will adjust as follows: .  - .

## 2025-03-14 NOTE — CONSULTS
Inpatient consult to Cardiology  Consult performed by: Natlaia Arceo FNP  Consult ordered by: Louise León NP  Reason for consult: Elevated Troponin        [facility]    Cardiology  Consult Note    Patient Name: Davi Glaser  MRN: [id number]  Admission Date: 3/13/2025  Hospital Length of Stay: 1 days  Code Status: Full Code   Attending Provider: Romel Zapata MD   Consulting Provider: VIKKI Herrera  Primary Care Physician: Marleny Llamas NP  Principal Problem:NSTEMI (non-ST elevated myocardial infarction)    Patient information was obtained from patient, past medical records, ER records, and primary team.     Subjective:     Chief Complaint/Reason for Consult: Elevated Troponin     HPI: Mr. Glaser is a 75 y/o male with a history of HTN, HLD, who is unknown to [facility]. He presented to the ER on 3.13.25 with complaints of abdominal pain. He reports symptoms started several days ago. He reports pain is constant. He reports he sees a surgical oncologist and GI specialist and is suppose to remove a mass. He reports a race on his neck that he thins is from taking Carafate. Significant labs include H&H 12.9/38.6, PLTs 418, Creat 1.41, Glucose 154, .2, Troponin 0.064 (peak). CTA Chest unremarkable. [facility] has been consulted for Elevated troponin.     PMH: HTN, HLD, GERD, H. Pylori  PSH: Appendectomy, EGD  Family History: Sister - Breast Cancer   Social History: Current Smoker. Denies illicit drug use and alcohol use.     Previous Cardiac Diagnostics:   ECHO (3.1.25):  Left ventricle: The cavity size is mildly increased. Wall thickness is mildly increased. Systolic function is at the lower limits of normal. The estimated ejection fraction is 50%. Doppler parameters are consistent with abnormal left ventricular relaxation (grade 1 diastolic dysfunction). Right ventricle: The cavity size is normal. Wall thickness is normal. Systolic function is normal. Systolic pressure is  moderately increased. The RV pressure during systole by Doppler is 49 mm Hg. Left atrium: The atrium is normal in size. The end-systolic volume index (2-plane Bowling's) is 28 ml/m^2. Right atrium: The atrium is normal in size. Mitral valve: The leaflets are normal (thickness). There is no evidence for stenosis. There is trivial regurgitation. Aortic valve: TrileafletThe leaflets are normal thickness. There is no stenosis. There is no significant regurgitation. Tricuspid valve: There is trivial regurgitation. Pulmonic valve: There is no significant regurgitation. Aortic root: The aortic root is normal. Pericardium, extracardiac: There is no pericardial effusion. No evidence of pleura fluid accumulation. Inferior vena cava: The vessel is dilated. The internal diameter is 2.5 cm.     Review of patient's allergies indicates:   Allergen Reactions    Aspirin Shortness Of Breath     Pt reports ASA makes him feel SOB.     Nsaids (non-steroidal anti-inflammatory drug) Other (See Comments)     Patient reports not being able to take these and he feels bad when he takes this and does not feel right.       Ibuprofen Nausea Only     No current facility-administered medications on file prior to encounter.     Current Outpatient Medications on File Prior to Encounter   Medication Sig    amLODIPine (NORVASC) 10 MG tablet Take 10 mg by mouth once daily.    furosemide (LASIX) 20 MG tablet Take 20 mg by mouth once daily.    isosorbide mononitrate (IMDUR) 30 MG 24 hr tablet Take 30 mg by mouth once daily.    lisinopriL (PRINIVIL,ZESTRIL) 40 MG tablet Take 40 mg by mouth once daily.    pantoprazole (PROTONIX) 40 MG tablet Take 1 tablet (40 mg total) by mouth 2 (two) times daily. (Patient not taking: Reported on 3/11/2025)    sucralfate (CARAFATE) 1 gram tablet Take 1 tablet (1 g total) by mouth 4 (four) times daily before meals and nightly. for 10 days     Review of Systems   Constitutional:  Negative for diaphoresis.   Respiratory:   "Negative for chest tightness and shortness of breath.    Cardiovascular:  Negative for chest pain, palpitations and leg swelling.   Gastrointestinal:  Positive for abdominal pain.   All other systems reviewed and are negative.      Objective:     Vital Signs (Most Recent):  Temp: 97.9 °F (36.6 °C) (03/14/25 0803)  Pulse: (!) 57 (03/14/25 0803)  Resp: 20 (03/13/25 2002)  BP: (!) 144/75 (03/14/25 0803)  SpO2: 96 % (03/14/25 0803) Vital Signs (24h Range):  Temp:  [97.4 °F (36.3 °C)-97.9 °F (36.6 °C)] 97.9 °F (36.6 °C)  Pulse:  [51-63] 57  Resp:  [16-20] 20  SpO2:  [93 %-98 %] 96 %  BP: (126-204)/(64-97) 144/75   Weight: 95.3 kg (210 lb)  Body mass index is 24.9 kg/m².  SpO2: 96 %     No intake or output data in the 24 hours ending 03/14/25 0825  Lines/Drains/Airways       Peripheral Intravenous Line  Duration                  Peripheral IV - Single Lumen 03/13/25 1757 18 G Anterior;Left Upper Arm <1 day                  Significant Labs:   Chemistries:   Recent Labs   Lab 03/13/25  1158 03/13/25  2136 03/14/25  0201     --  139   K 4.3  --  4.9     --  104   CO2 30  --  26   BUN 12.1  --  14.6   CREATININE 1.46*  --  1.41*   CALCIUM 9.7  --  9.8   BILITOT 0.5  --  0.5   ALKPHOS 80  --  82   ALT 34  --  30   AST 20  --  19   GLUCOSE 76*  --  154*   MG 2.20  --   --    TROPONINI 0.064* 0.049* 0.042        CBC/Anemia Labs: Coags:    Recent Labs   Lab 03/13/25  1224 03/14/25  0201   WBC 7.54 7.81   HGB 12.6* 12.9*   HCT 37.6* 38.6*   * 418*   MCV 81.2 80.2   RDW 15.8 15.5    No results for input(s): "PT", "INR", "APTT" in the last 168 hours.     Significant Imaging:  Imaging Results              CTA Chest Non-Coronary (PE Studies) (Final result)  Result time 03/13/25 19:09:47      Final result by Ayla Colorado MD (03/13/25 19:09:47)                   Impression:      1. No evidence of pulmonary embolus.      Electronically signed by: Ayla Colorado  Date:    03/13/2025  Time:    19:09          "      Narrative:    EXAMINATION:  CTA CHEST NON CORONARY (PE STUDIES)    CLINICAL HISTORY:  Pulmonary embolism (PE) suspected, unknown D-dimer;   chest pain    TECHNIQUE:  Helically acquired images with axial, sagittal and coronal reformations were obtained from the thoracic inlet to the lung bases followingthe IV administration of contrast.  CTA timed for evaluation of the pulmonary arteries.  MIP images were performed.    Automated tube current modulation, weight-based exposure dosing, and/or iterative reconstruction technique utilized to reach lowest reasonably achievable exposure rate.    DLP: 425 mGy*cm    COMPARISON:  No relevant priors available for comparison at time of this dictation    FINDINGS:  BASE OF NECK: No significant abnormality.    AORTA: Aortic atherosclerosis.    PULMONARY VASCULATURE: Pulmonary arteries enhance normally.  No evidence of pulmonary embolus. Bolus timing insufficient for definitive exclusion of small peripheral pulmonary embolism.    HEART: Normal size. No effusion.    MALA/MEDIASTINUM: No enlarged lymph nodes by size criteria.    AIRWAYS: Patent.    LUNGS/PLEURA: Clear lungs. No pleural effusion or thickening.    UPPER ABDOMEN: Lobulated renal contour.  This limits evaluation for underlying mass    THORACIC SOFT TISSUES: Unremarkable.    BONES: Flowing osteophytes of the thoracic spine as can be seen with DISH.                                    EKG:       Telemetry:  SB    Physical Exam  Vitals and nursing note reviewed.   HENT:      Head: Normocephalic.      Nose: Nose normal.      Mouth/Throat:      Mouth: Mucous membranes are moist.   Eyes:      Extraocular Movements: Extraocular movements intact.   Cardiovascular:      Rate and Rhythm: Normal rate and regular rhythm.      Pulses: Normal pulses.      Heart sounds: Normal heart sounds.   Pulmonary:      Effort: Pulmonary effort is normal.   Abdominal:      Palpations: Abdomen is soft.   Musculoskeletal:         General: Normal  range of motion.      Cervical back: Normal range of motion.   Skin:     General: Skin is warm.   Neurological:      Mental Status: He is alert and oriented to person, place, and time.   Psychiatric:         Mood and Affect: Mood normal.         Behavior: Behavior normal.         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 Pylori  No known history of GI Bleed     *ASA Allergy*    Plan:   Recent ECHO Reviewed   Do not suspect ACS  Continue Current Medications   Outpatient Stress Test (PET)  Follow-up with Dr. Perea in 1-2 weeks     Thank you for your consult.     VIKKI Herrera  Cardiology  [facility]    I agree with the findings of the complexity of problems addressed and take responsibility for the plan's risks and complications. I approved the plan documented by Natalia Arceo NP.            [1]   No current facility-administered medications on file prior to encounter.     Current Outpatient Medications on File Prior to Encounter   Medication Sig Dispense Refill    amLODIPine (NORVASC) 10 MG tablet Take 10 mg by mouth once daily.      furosemide (LASIX) 20 MG tablet Take 20 mg by mouth once daily.      isosorbide mononitrate (IMDUR) 30 MG 24 hr tablet Take 30 mg by mouth once daily.      lisinopriL (PRINIVIL,ZESTRIL) 40 MG tablet Take 40 mg by mouth once daily.      pantoprazole (PROTONIX) 40 MG tablet Take 1 tablet (40 mg total) by mouth 2 (two) times daily. (Patient not taking: Reported on 3/11/2025) 180 tablet 0    sucralfate  (CARAFATE) 1 gram tablet Take 1 tablet (1 g total) by mouth 4 (four) times daily before meals and nightly. for 10 days 40 tablet 0

## 2025-03-14 NOTE — HPI
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  Tonight he presents to the ER complaining of persistent epigastric abdominal pain as well as a new rash to his neck he believes maybe related to his medications.  Denies fever chills or chest pain.     He arrived to the ER afebrile but hypertensive with a systolic in the 190s, hemodynamically stable maintaining normal sats on room air.  EKG noted sinus bradycardia with a first-degree AV block.  Laboratory work noted a troponin of 0.064 and a mildly elevated beta BNP of 212 (note recent echo showed normal EF and grade 1 DD this month).  Consultation was placed to Cardiology, patient was given Solu-Medrol and medications to lower blood pressure and admitted to the hospitalist service for further management.

## 2025-03-14 NOTE — PROGRESS NOTES
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  Tonight he presents to the ER complaining of persistent epigastric abdominal pain as well as a new rash to his neck he believes maybe related to his medications.  Denies fever chills or chest pain.     He arrived to the ER afebrile but hypertensive with a systolic in the 190s, hemodynamically stable maintaining normal sats on room air.  EKG noted sinus bradycardia with a first-degree AV block.  Laboratory work noted a troponin of 0.064 and a mildly elevated beta BNP of 212 (note recent echo showed normal EF and grade 1 DD this month).  Consultation was placed to Cardiology, patient was given Solu-Medrol and medications to lower blood pressure and admitted to the hospitalist service for further management.    Overview/Hospital Course:  3/14/25-Patient states that his swelling started when he started the carafate.  He does have some wheezing today.  Cardiology is also seeing him as well due to an elevated troponin.    Interval History:     Review of Systems   Constitutional:  Positive for activity change.   HENT: Negative.     Eyes: Negative.    Respiratory: Negative.     Cardiovascular:  Positive for leg swelling.   Gastrointestinal:  Positive for abdominal distention.    Endocrine: Negative.    Genitourinary: Negative.    Musculoskeletal:  Positive for joint swelling.   Skin: Negative.    Allergic/Immunologic: Negative.    Neurological: Negative.    Hematological: Negative.    Psychiatric/Behavioral: Negative.       Objective:     Vital Signs (Most Recent):  Temp: 97.9 °F (36.6 °C) (03/14/25 1113)  Pulse: 68 (03/14/25 1113)  Resp: 20 (03/13/25 2002)  BP: (!) 166/73 (03/14/25 1113)  SpO2: 96 % (03/14/25 1113) Vital Signs (24h Range):  Temp:  [97.4 °F (36.3 °C)-97.9 °F (36.6 °C)] 97.9 °F (36.6 °C)  Pulse:  [51-68] 68  Resp:  [16-20] 20  SpO2:  [93 %-98 %] 96 %  BP: (126-204)/(64-97) 166/73     Weight: 95.3 kg (210 lb)  Body mass index is 24.9 kg/m².    Intake/Output Summary (Last 24 hours) at 3/14/2025 1252  Last data filed at 3/14/2025 0902  Gross per 24 hour   Intake 240 ml   Output --   Net 240 ml         Physical Exam  Constitutional:       Appearance: Normal appearance. He is normal weight.   HENT:      Head: Normocephalic and atraumatic.      Nose: Nose normal.      Mouth/Throat:      Mouth: Mucous membranes are moist.      Pharynx: Oropharynx is clear.   Eyes:      Extraocular Movements: Extraocular movements intact.      Conjunctiva/sclera: Conjunctivae normal.      Pupils: Pupils are equal, round, and reactive to light.   Cardiovascular:      Rate and Rhythm: Normal rate and regular rhythm.      Pulses: Normal pulses.      Heart sounds: Normal heart sounds.   Pulmonary:      Effort: Pulmonary effort is normal.      Breath sounds: Wheezing present.   Abdominal:      General: Bowel sounds are normal.      Palpations: Abdomen is soft.   Musculoskeletal:         General: Normal range of motion.      Cervical back: Normal range of motion and neck supple.      Right lower leg: Edema present.      Left lower leg: Edema present.      Comments: Trace edema   Skin:     General: Skin is warm and dry.      Capillary Refill: Capillary refill takes 2 to 3 seconds.   Neurological:       General: No focal deficit present.      Mental Status: He is alert and oriented to person, place, and time. Mental status is at baseline.   Psychiatric:         Behavior: Behavior normal.         Judgment: Judgment normal.               Significant Labs: All pertinent labs within the past 24 hours have been reviewed.  BMP:   Recent Labs   Lab 03/13/25  1158 03/14/25  0201    139   K 4.3 4.9    104   CO2 30 26   BUN 12.1 14.6   CREATININE 1.46* 1.41*   CALCIUM 9.7 9.8   MG 2.20  --      CBC:   Recent Labs   Lab 03/13/25  1224 03/14/25  0201   WBC 7.54 7.81   HGB 12.6* 12.9*   HCT 37.6* 38.6*   * 418*     CMP:   Recent Labs   Lab 03/13/25  1158 03/14/25  0201    139   K 4.3 4.9    104   CO2 30 26   BUN 12.1 14.6   CREATININE 1.46* 1.41*   CALCIUM 9.7 9.8   ALBUMIN 3.9 3.8   BILITOT 0.5 0.5   ALKPHOS 80 82   AST 20 19   ALT 34 30     Magnesium:   Recent Labs   Lab 03/13/25  1158   MG 2.20       Significant Imaging: I have reviewed all pertinent imaging results/findings within the past 24 hours.      Assessment & Plan  NSTEMI (non-ST elevated myocardial infarction)  Cardiology following  telemetry    Primary hypertension  Patient's blood pressure range in the last 24 hours was: BP  Min: 126/66  Max: 204/97.The patient's inpatient anti-hypertensive regimen is listed below:  Current Antihypertensives  amLODIPine tablet 10 mg, Daily, Oral  isosorbide mononitrate 24 hr tablet 30 mg, Daily, Oral  lisinopriL tablet 40 mg, Daily, Oral  hydrALAZINE injection 20 mg, Every 4 hours PRN, Intravenous  labetaloL injection 20 mg, Every 4 hours PRN, Intravenous    Plan  - BP is uncontrolled, will adjust as follows: .  - .  VTE Risk Mitigation (From admission, onward)           Ordered     enoxaparin injection 40 mg  Daily         03/13/25 2056     IP VTE HIGH RISK PATIENT  Once         03/13/25 2056     Place sequential compression device  Until discontinued         03/13/25 2056                  DVT  prophylaxis  Stop carafate  Cardiology recs  Telemetry  Possible d/c in 1-2 days  Discharge Planning   DENIA:      Code Status: Full Code   Medical Readiness for Discharge Date:   Discharge Plan A: Home                        Fam Cleveland MD  Department of Hospital Medicine   [facility] - [facility]

## 2025-03-14 NOTE — HOSPITAL COURSE
3/14/25-Patient states that his swelling started when he started the carafate.  He does have some wheezing today.  Cardiology is also seeing him as well due to an elevated troponin.    3/15/25-Patient is doing well.  Cardiology has cleared the patient.  He needs to follow up with his oncological surgeon as instructed on his last admit.  Otherwise he is stable.  Exam(A&O, NAD, RRR, CTA, BS +, ROM I)

## 2025-03-14 NOTE — PLAN OF CARE
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  Tonight he presents to the ER complaining of persistent epigastric abdominal pain as well as a new rash to his neck he believes maybe related to his medication   People in Home alone   Facility Arrived From: [facility]; [place], [place].   Do you expect to return to your current living situation? Yes   Do you have help at home or someone to help you manage your care at home? Yes   Who are your caregiver(s) and their phone number(s)? Delia Pedrazanay (Friend)  [phone] (Mobile) lives in [place], [place].   Prior to hospitilization cognitive status: Alert/Oriented   Current cognitive status: Alert/Oriented   Walking or Climbing Stairs Difficulty no   Dressing/Bathing Difficulty no   Home Accessibility   (There are (3-4) steps to climb prior to entering his private residence.)   Home Layout Able to live on 1st floor   Equipment Currently Used at Home none   Readmission within 30 days? Yes   Patient currently being followed by outpatient case management? No   Do you currently have service(s) that help you manage your care at home? No   Do you take prescription medications? Yes   Do you have prescription coverage? Yes   Coverage Payor: [employer]  [facility] -   Do you have any problems affording any of your prescribed medications? No   Is the patient taking medications as prescribed? yes   Who is going to help you get home at discharge? Alejandro Pedraza (Friend) [phone] (Mobile) lives in [place], [place].   How do you get to doctors appointments? car, drives self   Are you on dialysis? No   Do you take coumadin? No   Discharge Plan A Home   Discharge Plan B Home   DME Needed Upon Discharge  none   Discharge Plan discussed with: Patient   Transition of Care Barriers None   Financial Resource Strain   How hard is it for you to pay for the very basics like food, housing, medical care, and heating? Not very   Housing Stability   In the last 12 months, was there a time when you were not able to pay the mortgage or rent on time? N   At any time in the past 12 months, were you homeless or living in a shelter (including now)? N   Transportation Needs   Has the lack of transportation kept you from medical appointments, meetings, work or from getting things needed for daily living? No   Food Insecurity   Within the past 12 months, you worried that your food would run out before you got the money to buy more. Never true   Within the past 12 months, the food you bought just didn't last and you didn't have money to get more. Never true   Stress   Do you feel stress - tense, restless, nervous, or anxious, or unable to sleep at night because your mind is troubled all the time - these days? Only a littl   Social Isolation   How often do you feel lonely or isolated from those around you?  Rarely   Alcohol Use   Q1: How often do you have a drink containing alcohol? Never   Q2: How many drinks containing alcohol do you have on a typical day when you are drinking? None   Q3: How often do you have six or more drinks on one occasion? Never   Utilities   In the past 12 months has the electric, gas, oil, or water company threatened to shut off services  in your home? No   Health Literacy   How often do you need to have someone help you when you read instructions, pamphlets, or other written material from your doctor or pharmacy? Never     Friend: Alejandro Vance: [phone] (Lives in [place], [place]).   PCP: Marleny Llamas  Pharmacy: [facility]: ([facility]).  DME: None.

## 2025-03-14 NOTE — SUBJECTIVE & OBJECTIVE
Interval History:     Review of Systems   Constitutional:  Positive for activity change.   HENT: Negative.     Eyes: Negative.    Respiratory: Negative.     Cardiovascular:  Positive for leg swelling.   Gastrointestinal:  Positive for abdominal distention.   Endocrine: Negative.    Genitourinary: Negative.    Musculoskeletal:  Positive for joint swelling.   Skin: Negative.    Allergic/Immunologic: Negative.    Neurological: Negative.    Hematological: Negative.    Psychiatric/Behavioral: Negative.       Objective:     Vital Signs (Most Recent):  Temp: 97.9 °F (36.6 °C) (03/14/25 1113)  Pulse: 68 (03/14/25 1113)  Resp: 20 (03/13/25 2002)  BP: (!) 166/73 (03/14/25 1113)  SpO2: 96 % (03/14/25 1113) Vital Signs (24h Range):  Temp:  [97.4 °F (36.3 °C)-97.9 °F (36.6 °C)] 97.9 °F (36.6 °C)  Pulse:  [51-68] 68  Resp:  [16-20] 20  SpO2:  [93 %-98 %] 96 %  BP: (126-204)/(64-97) 166/73     Weight: 95.3 kg (210 lb)  Body mass index is 24.9 kg/m².    Intake/Output Summary (Last 24 hours) at 3/14/2025 1252  Last data filed at 3/14/2025 0902  Gross per 24 hour   Intake 240 ml   Output --   Net 240 ml         Physical Exam  Constitutional:       Appearance: Normal appearance. He is normal weight.   HENT:      Head: Normocephalic and atraumatic.      Nose: Nose normal.      Mouth/Throat:      Mouth: Mucous membranes are moist.      Pharynx: Oropharynx is clear.   Eyes:      Extraocular Movements: Extraocular movements intact.      Conjunctiva/sclera: Conjunctivae normal.      Pupils: Pupils are equal, round, and reactive to light.   Cardiovascular:      Rate and Rhythm: Normal rate and regular rhythm.      Pulses: Normal pulses.      Heart sounds: Normal heart sounds.   Pulmonary:      Effort: Pulmonary effort is normal.      Breath sounds: Wheezing present.   Abdominal:      General: Bowel sounds are normal.      Palpations: Abdomen is soft.   Musculoskeletal:         General: Normal range of motion.      Cervical back: Normal  range of motion and neck supple.      Right lower leg: Edema present.      Left lower leg: Edema present.      Comments: Trace edema   Skin:     General: Skin is warm and dry.      Capillary Refill: Capillary refill takes 2 to 3 seconds.   Neurological:      General: No focal deficit present.      Mental Status: He is alert and oriented to person, place, and time. Mental status is at baseline.   Psychiatric:         Behavior: Behavior normal.         Judgment: Judgment normal.               Significant Labs: All pertinent labs within the past 24 hours have been reviewed.  BMP:   Recent Labs   Lab 03/13/25  1158 03/14/25  0201    139   K 4.3 4.9    104   CO2 30 26   BUN 12.1 14.6   CREATININE 1.46* 1.41*   CALCIUM 9.7 9.8   MG 2.20  --      CBC:   Recent Labs   Lab 03/13/25  1224 03/14/25  0201   WBC 7.54 7.81   HGB 12.6* 12.9*   HCT 37.6* 38.6*   * 418*     CMP:   Recent Labs   Lab 03/13/25  1158 03/14/25  0201    139   K 4.3 4.9    104   CO2 30 26   BUN 12.1 14.6   CREATININE 1.46* 1.41*   CALCIUM 9.7 9.8   ALBUMIN 3.9 3.8   BILITOT 0.5 0.5   ALKPHOS 80 82   AST 20 19   ALT 34 30     Magnesium:   Recent Labs   Lab 03/13/25  1158   MG 2.20       Significant Imaging: I have reviewed all pertinent imaging results/findings within the past 24 hours.

## 2025-03-15 VITALS
BODY MASS INDEX: 24.79 KG/M2 | SYSTOLIC BLOOD PRESSURE: 159 MMHG | DIASTOLIC BLOOD PRESSURE: 75 MMHG | HEART RATE: 62 BPM | HEIGHT: 77 IN | RESPIRATION RATE: 19 BRPM | OXYGEN SATURATION: 96 % | WEIGHT: 210 LBS | TEMPERATURE: 98 F

## 2025-03-15 PROCEDURE — 25000003 PHARM REV CODE 250: Performed by: NURSE PRACTITIONER

## 2025-03-15 PROCEDURE — 63600175 PHARM REV CODE 636 W HCPCS: Performed by: NURSE PRACTITIONER

## 2025-03-15 PROCEDURE — 96375 TX/PRO/DX INJ NEW DRUG ADDON: CPT

## 2025-03-15 PROCEDURE — G0378 HOSPITAL OBSERVATION PER HR: HCPCS

## 2025-03-15 RX ORDER — CLOPIDOGREL BISULFATE 75 MG/1
75 TABLET ORAL DAILY
Qty: 30 TABLET | Refills: 11 | Status: SHIPPED | OUTPATIENT
Start: 2025-03-16 | End: 2026-03-16

## 2025-03-15 RX ORDER — ISOSORBIDE MONONITRATE 30 MG/1
30 TABLET, EXTENDED RELEASE ORAL DAILY
Qty: 30 TABLET | Refills: 4 | Status: SHIPPED | OUTPATIENT
Start: 2025-03-15

## 2025-03-15 RX ORDER — AMLODIPINE BESYLATE 10 MG/1
10 TABLET ORAL DAILY
Qty: 90 TABLET | Refills: 4 | Status: SHIPPED | OUTPATIENT
Start: 2025-03-15

## 2025-03-15 RX ORDER — LISINOPRIL 40 MG/1
40 TABLET ORAL DAILY
Qty: 90 TABLET | Refills: 4 | Status: SHIPPED | OUTPATIENT
Start: 2025-03-15

## 2025-03-15 RX ORDER — PANTOPRAZOLE SODIUM 40 MG/1
40 TABLET, DELAYED RELEASE ORAL 2 TIMES DAILY
Qty: 180 TABLET | Refills: 3 | Status: SHIPPED | OUTPATIENT
Start: 2025-03-15 | End: 2025-06-13

## 2025-03-15 RX ORDER — CLOPIDOGREL BISULFATE 75 MG/1
75 TABLET ORAL DAILY
Qty: 30 TABLET | Refills: 11 | Status: SHIPPED | OUTPATIENT
Start: 2025-03-16 | End: 2025-03-15

## 2025-03-15 RX ADMIN — ISOSORBIDE MONONITRATE 30 MG: 30 TABLET, EXTENDED RELEASE ORAL at 09:03

## 2025-03-15 RX ADMIN — AMLODIPINE BESYLATE 10 MG: 5 TABLET ORAL at 09:03

## 2025-03-15 RX ADMIN — LISINOPRIL 40 MG: 20 TABLET ORAL at 09:03

## 2025-03-15 RX ADMIN — LABETALOL HYDROCHLORIDE 20 MG: 5 INJECTION, SOLUTION INTRAVENOUS at 07:03

## 2025-03-15 RX ADMIN — CLOPIDOGREL 75 MG: 75 TABLET ORAL at 09:03

## 2025-03-15 NOTE — ASSESSMENT & PLAN NOTE
Patient's blood pressure range in the last 24 hours was: BP  Min: 147/75  Max: 185/82.The patient's inpatient anti-hypertensive regimen is listed below:  Current Antihypertensives  amLODIPine tablet 10 mg, Daily, Oral  isosorbide mononitrate 24 hr tablet 30 mg, Daily, Oral  lisinopriL tablet 40 mg, Daily, Oral  hydrALAZINE injection 20 mg, Every 4 hours PRN, Intravenous  labetaloL injection 20 mg, Every 4 hours PRN, Intravenous    Plan  - BP is uncontrolled, will adjust as follows: .  - .

## 2025-03-15 NOTE — DISCHARGE SUMMARY
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  Tonight he presents to the ER complaining of persistent epigastric abdominal pain as well as a new rash to his neck he believes maybe related to his medications.  Denies fever chills or chest pain.     He arrived to the ER afebrile but hypertensive with a systolic in the 190s, hemodynamically stable maintaining normal sats on room air.  EKG noted sinus bradycardia with a first-degree AV block.  Laboratory work noted a troponin of 0.064 and a mildly elevated beta BNP of 212 (note recent echo showed normal EF and grade 1 DD this month).  Consultation was placed to Cardiology, patient was given Solu-Medrol and medications to lower blood pressure and admitted to the hospitalist service for further management.    * No surgery found *      Hospital Course:   3/14/25-Patient states that his swelling started when he started the carafate.  He does have some wheezing today.  Cardiology is also seeing him as well due to an elevated troponin.    3/15/25-Patient is doing well.  Cardiology has cleared the patient.  He needs to follow up with his oncological surgeon as instructed on his last admit.   Otherwise he is stable.  Exam(A&O, NAD, RRR, CTA, BS +, ROM I)     Goals of Care Treatment Preferences:  Code Status: Full Code      SDOH Screening:  The patient was screened for utility difficulties, food insecurity, transport difficulties, housing insecurity, and interpersonal safety and there were no concerns identified this admission.     Consults:   Consults (From admission, onward)          Status Ordering Provider     Inpatient consult to Cardiology  Once        Provider:  Luiz Gastelum MD    Completed YESENIA COLLINS            Assessment & Plan  NSTEMI (non-ST elevated myocardial infarction)  Cardiology following  telemetry    Primary hypertension  Patient's blood pressure range in the last 24 hours was: BP  Min: 147/75  Max: 185/82.The patient's inpatient anti-hypertensive regimen is listed below:  Current Antihypertensives  amLODIPine tablet 10 mg, Daily, Oral  isosorbide mononitrate 24 hr tablet 30 mg, Daily, Oral  lisinopriL tablet 40 mg, Daily, Oral  hydrALAZINE injection 20 mg, Every 4 hours PRN, Intravenous  labetaloL injection 20 mg, Every 4 hours PRN, Intravenous    Plan  - BP is uncontrolled, will adjust as follows: .  - .  Final Active Diagnoses:    Diagnosis Date Noted POA    PRINCIPAL PROBLEM:  NSTEMI (non-ST elevated myocardial infarction) [I21.4] 03/13/2025 Yes    Primary hypertension [I10] 03/14/2025 Yes      Problems Resolved During this Admission:       Discharged Condition: stable    Disposition: Home or Self Care    Follow Up:   Follow-up Information       Светлана Perea MD Follow up.    Specialties: Cardiovascular Disease, Cardiology  Why: 1-2 weeks  PET Stress Test  Contact information:  [address] [place] [id number] [phone]                           Patient Instructions:   No discharge procedures on file.    Significant Diagnostic Studies: Labs: BMP:   Recent Labs   Lab 03/14/25  0201      K 4.9      CO2 26   BUN 14.6   CREATININE 1.41*   CALCIUM 9.8    , CMP   Recent Labs   Lab 03/14/25  0201      K 4.9      CO2 26   BUN 14.6   CREATININE 1.41*   CALCIUM 9.8   ALBUMIN 3.8   BILITOT 0.5   ALKPHOS 82   AST 19   ALT 30   , and CBC   Recent Labs   Lab 03/14/25  0201   WBC 7.81   HGB 12.9*   HCT 38.6*   *       Pending Diagnostic Studies:       None           Medications:  Reconciled Home Medications:      Medication List        START taking these medications      clopidogreL 75 mg tablet  Commonly known as: PLAVIX  Take 1 tablet (75 mg total) by mouth once daily.  Start taking on: March 16, 2025     pantoprazole 40 MG tablet  Commonly known as: PROTONIX  Take 1 tablet (40 mg total) by mouth 2 (two) times daily.            CONTINUE taking these medications      amLODIPine 10 MG tablet  Commonly known as: NORVASC  Take 10 mg by mouth once daily.     furosemide 20 MG tablet  Commonly known as: LASIX  Take 20 mg by mouth once daily.     isosorbide mononitrate 30 MG 24 hr tablet  Commonly known as: IMDUR  Take 30 mg by mouth once daily.     lisinopriL 40 MG tablet  Commonly known as: PRINIVIL,ZESTRIL  Take 40 mg by mouth once daily.            STOP taking these medications      sucralfate 1 gram tablet  Commonly known as: CARAFATE              Indwelling Lines/Drains at time of discharge:   Lines/Drains/Airways       None                   Time spent on the discharge of patient: 35 minutes     Patient screened for food insecurity, housing instability, transportation needs, utility difficulties, and interpersonal safety.   No needs       Fam Cleveland MD  Department of [facility]  [facility] - [facility]

## 2025-03-17 ENCOUNTER — PATIENT OUTREACH (OUTPATIENT)
Dept: ADMINISTRATIVE | Facility: CLINIC | Age: 75
End: 2025-03-17
Payer: MEDICARE

## 2025-03-17 NOTE — PROGRESS NOTES
C3 nurse attempted to contact Davi Glaser  for a TCC post hospital discharge follow up call. No answer. Left voicemail with callback information. The patient does not have a scheduled HOSFU appointment noted. Unable to message PCP staff.

## 2025-03-18 NOTE — PROGRESS NOTES
C3 nurse spoke with Davi Glaser  for a TCC post hospital discharge follow up call. The patient does not have a scheduled [facility] appointment with Marleny Llamas NP  within 5-7 days post hospital discharge date 03/15/2025. Patient stated he would have to call office to schedule appointment. Patient made aware Dr. Светлана Perea's office (Cardiology) will call to schedule an appointment for stress test. Stated someone called today with a date 03/31/2025 and will call the number back to verify appointment.   After going over appointments and asking general questions patient stated he had enough and abruptly hung up the phone.  Called Marleny Llamas NP office to make aware patient need hospital follow up appointment; spoke with Josefina.  Stated she will call patient to schedule hospital follow up appointment.

## 2025-07-23 ENCOUNTER — TELEPHONE (OUTPATIENT)
Dept: PRIMARY CARE CLINIC | Facility: CLINIC | Age: 75
End: 2025-07-23
Payer: MEDICARE

## 2025-07-23 NOTE — TELEPHONE ENCOUNTER
Copied from CRM #[id number]. Topic: General Inquiry - Patient Advice  >> Jul 22, 2025  3:58 PM Yesica Trinh wrote:  Who Called: Davi Glaser    Caller is requesting a sooner appointment. Caller declined first available appointment listed below. Caller will not accept being placed on the waitlist and is requesting a message be sent to doctor.    When is the first available appointment?8/7  Options offered (Virtual Visit, Urgent Care):   Symptoms:swollen stomach      Preferred Method of Contact: Phone Call  Patient's Preferred Phone Number on File: [phone]   Best Call Back Number, if different:  Additional Information: pt is in need of a sooner appt. If someone can call back as soon as possible

## 2025-07-25 ENCOUNTER — HOSPITAL ENCOUNTER (EMERGENCY)
Facility: HOSPITAL | Age: 75
Discharge: HOME | End: 2025-07-25
Attending: EMERGENCY MEDICINE
Payer: MEDICARE

## 2025-07-25 VITALS
DIASTOLIC BLOOD PRESSURE: 88 MMHG | TEMPERATURE: 98 F | HEIGHT: 77 IN | WEIGHT: 211 LBS | SYSTOLIC BLOOD PRESSURE: 175 MMHG | BODY MASS INDEX: 24.91 KG/M2 | RESPIRATION RATE: 19 BRPM | HEART RATE: 59 BPM | OXYGEN SATURATION: 95 %

## 2025-07-25 DIAGNOSIS — R10.9 ABDOMINAL PAIN: ICD-10-CM

## 2025-07-25 DIAGNOSIS — K29.70 GASTRITIS, PRESENCE OF BLEEDING UNSPECIFIED, UNSPECIFIED CHRONICITY, UNSPECIFIED GASTRITIS TYPE: Primary | ICD-10-CM

## 2025-07-25 LAB
ALBUMIN SERPL-MCNC: 3.9 G/DL (ref 3.4–4.8)
ALBUMIN/GLOB SERPL: 1.3 RATIO (ref 1.1–2)
ALP SERPL-CCNC: 57 UNIT/L (ref 40–150)
ALT SERPL-CCNC: 20 UNIT/L (ref 0–55)
ANION GAP SERPL CALC-SCNC: 9 MEQ/L
AST SERPL-CCNC: 17 UNIT/L (ref 11–45)
BACTERIA #/AREA URNS AUTO: ABNORMAL /HPF
BASOPHILS # BLD AUTO: 0.05 X10(3)/MCL
BASOPHILS NFR BLD AUTO: 0.5 %
BILIRUB SERPL-MCNC: 0.9 MG/DL
BILIRUB UR QL STRIP.AUTO: NEGATIVE
BUN SERPL-MCNC: 14.3 MG/DL (ref 8.4–25.7)
CALCIUM SERPL-MCNC: 9.6 MG/DL (ref 8.8–10)
CHLORIDE SERPL-SCNC: 107 MMOL/L (ref 98–107)
CLARITY UR: CLEAR
CO2 SERPL-SCNC: 27 MMOL/L (ref 23–31)
COLOR UR AUTO: COLORLESS
CREAT SERPL-MCNC: 1.57 MG/DL (ref 0.72–1.25)
CREAT/UREA NIT SERPL: 9
EOSINOPHIL # BLD AUTO: 0.31 X10(3)/MCL (ref 0–0.9)
EOSINOPHIL NFR BLD AUTO: 2.9 %
ERYTHROCYTE [DISTWIDTH] IN BLOOD BY AUTOMATED COUNT: 15.7 % (ref 11.5–17)
GFR SERPLBLD CREATININE-BSD FMLA CKD-EPI: 46 ML/MIN/1.73/M2
GLOBULIN SER-MCNC: 3.1 GM/DL (ref 2.4–3.5)
GLUCOSE SERPL-MCNC: 87 MG/DL (ref 82–115)
GLUCOSE UR QL STRIP: NORMAL
HCT VFR BLD AUTO: 41 % (ref 42–52)
HGB BLD-MCNC: 13.3 G/DL (ref 14–18)
HGB UR QL STRIP: NEGATIVE
HYALINE CASTS #/AREA URNS LPF: ABNORMAL /LPF
IMM GRANULOCYTES # BLD AUTO: 0.08 X10(3)/MCL (ref 0–0.04)
IMM GRANULOCYTES NFR BLD AUTO: 0.8 %
KETONES UR QL STRIP: NEGATIVE
LEUKOCYTE ESTERASE UR QL STRIP: NEGATIVE
LYMPHOCYTES # BLD AUTO: 3.12 X10(3)/MCL (ref 0.6–4.6)
LYMPHOCYTES NFR BLD AUTO: 29.5 %
MAGNESIUM SERPL-MCNC: 2.2 MG/DL (ref 1.6–2.6)
MCH RBC QN AUTO: 28.3 PG (ref 27–31)
MCHC RBC AUTO-ENTMCNC: 32.4 G/DL (ref 33–36)
MCV RBC AUTO: 87.2 FL (ref 80–94)
MONOCYTES # BLD AUTO: 0.97 X10(3)/MCL (ref 0.1–1.3)
MONOCYTES NFR BLD AUTO: 9.2 %
NEUTROPHILS # BLD AUTO: 6.06 X10(3)/MCL (ref 2.1–9.2)
NEUTROPHILS NFR BLD AUTO: 57.1 %
NITRITE UR QL STRIP: NEGATIVE
NRBC BLD AUTO-RTO: 0 %
NT-PROBNP SERPL-MCNC: 265 PG/ML
OHS QRS DURATION: 106 MS
OHS QTC CALCULATION: 414 MS
PH UR STRIP: 6 [PH]
PLATELET # BLD AUTO: 392 X10(3)/MCL (ref 130–400)
PMV BLD AUTO: 10.1 FL (ref 7.4–10.4)
POTASSIUM SERPL-SCNC: 3.3 MMOL/L (ref 3.5–5.1)
PROT SERPL-MCNC: 7 GM/DL (ref 5.8–7.6)
PROT UR QL STRIP: NEGATIVE
RBC # BLD AUTO: 4.7 X10(6)/MCL (ref 4.7–6.1)
RBC #/AREA URNS AUTO: ABNORMAL /HPF
SODIUM SERPL-SCNC: 143 MMOL/L (ref 136–145)
SP GR UR STRIP.AUTO: 1.02 (ref 1–1.03)
SQUAMOUS #/AREA URNS LPF: ABNORMAL /HPF
TROPONIN I SERPL HS-MCNC: 82 NG/L
TROPONIN I SERPL HS-MCNC: 89 NG/L
UROBILINOGEN UR STRIP-ACNC: NORMAL
WBC # BLD AUTO: 10.59 X10(3)/MCL (ref 4.5–11.5)
WBC #/AREA URNS AUTO: ABNORMAL /HPF

## 2025-07-25 PROCEDURE — 83880 ASSAY OF NATRIURETIC PEPTIDE: CPT | Performed by: PHYSICIAN ASSISTANT

## 2025-07-25 PROCEDURE — 93010 ELECTROCARDIOGRAM REPORT: CPT | Mod: ,,, | Performed by: INTERNAL MEDICINE

## 2025-07-25 PROCEDURE — 25500020 PHARM REV CODE 255: Performed by: EMERGENCY MEDICINE

## 2025-07-25 PROCEDURE — 81015 MICROSCOPIC EXAM OF URINE: CPT | Performed by: PHYSICIAN ASSISTANT

## 2025-07-25 PROCEDURE — 83735 ASSAY OF MAGNESIUM: CPT | Performed by: PHYSICIAN ASSISTANT

## 2025-07-25 PROCEDURE — 99285 EMERGENCY DEPT VISIT HI MDM: CPT | Mod: 25

## 2025-07-25 PROCEDURE — 25000003 PHARM REV CODE 250: Performed by: EMERGENCY MEDICINE

## 2025-07-25 PROCEDURE — 93005 ELECTROCARDIOGRAM TRACING: CPT

## 2025-07-25 PROCEDURE — 84484 ASSAY OF TROPONIN QUANT: CPT | Performed by: PHYSICIAN ASSISTANT

## 2025-07-25 PROCEDURE — 80053 COMPREHEN METABOLIC PANEL: CPT | Performed by: PHYSICIAN ASSISTANT

## 2025-07-25 PROCEDURE — 84484 ASSAY OF TROPONIN QUANT: CPT | Performed by: EMERGENCY MEDICINE

## 2025-07-25 PROCEDURE — 85025 COMPLETE CBC W/AUTO DIFF WBC: CPT | Performed by: PHYSICIAN ASSISTANT

## 2025-07-25 RX ORDER — PANTOPRAZOLE SODIUM 40 MG/1
40 TABLET, DELAYED RELEASE ORAL DAILY
Qty: 30 TABLET | Refills: 1 | Status: SHIPPED | OUTPATIENT
Start: 2025-07-25

## 2025-07-25 RX ORDER — PANTOPRAZOLE SODIUM 40 MG/1
40 TABLET, DELAYED RELEASE ORAL
Status: COMPLETED | OUTPATIENT
Start: 2025-07-25 | End: 2025-07-25

## 2025-07-25 RX ADMIN — IOHEXOL 100 ML: 350 INJECTION, SOLUTION INTRAVENOUS at 11:07

## 2025-07-25 RX ADMIN — PANTOPRAZOLE SODIUM 40 MG: 40 TABLET, DELAYED RELEASE ORAL at 03:07

## 2025-07-25 NOTE — FIRST PROVIDER EVALUATION
Medical screening examination initiated.  I have conducted a focused provider triage encounter, findings are as follows:    Brief history of present illness:  74yoA[facility] CC department with 2 years of abdominal pain and low back pain.  Denies other symptoms.  Patient poor historian.      Vitals:    07/25/25 1319 07/25/25 1334 07/25/25 1432 07/25/25 1510   BP: (!) 174/86 (!) 180/84  (!) 175/88   Pulse: (!) 55 (!) 59 (!) 53 (!) 59   Resp:    19   Temp:    98.2 °F (36.8 °C)   TempSrc:    Oral   SpO2: 98% 99% 98% 95%   Weight:       Height:           Pertinent physical exam:  NAD    Brief workup plan:  labs and imaging    Preliminary workup initiated; this workup will be continued and followed by the physician or advanced practice provider that is assigned to the patient when roomed.

## 2025-07-25 NOTE — ED PROVIDER NOTES
Encounter Date: 7/25/2025    SCRIBE #1 NOTE: I, Susana Mayen, am scribing for, and in the presence of,  Josiah Murry III, MD. I have scribed the following portions of the note - the EKG reading. Other sections scribed: HPI, ROS, PE.       History     Chief Complaint   Patient presents with    Back Pain     Pt ambulatory to triage and presents via POV. C/o abd distention which began 2 years PTA and bilateral flank pain. Denies N/V/D and other symptoms at this time.      Patient is a 74 year old male with a past medical history of GERD, hyperlipidemia, and hypertension presenting to the ED for evaluation of abdominal pain and distention. Patient reports the pain has been intermittent for the past 2-3 years but noticed the distention was worse today so he came in. He reports congestion and epistaxis episodes when blowing his nose. Patient endorses alcohol use.    The history is provided by the patient. No  was used.     Review of patient's allergies indicates:   Allergen Reactions    Aspirin Shortness Of Breath     Pt reports ASA makes him feel SOB.     Carafate [sucralfate] Itching, Swelling and Rash    Nsaids (non-steroidal anti-inflammatory drug) Other (See Comments)     Patient reports not being able to take these and he feels bad when he takes this and does not feel right.       Ibuprofen Nausea Only     Past Medical History:   Diagnosis Date    GERD (gastroesophageal reflux disease)     H. pylori infection     HLD (hyperlipidemia)      Past Surgical History:   Procedure Laterality Date    APPENDECTOMY      EGD, WITH CLOSED BIOPSY N/A 3/7/2025    Procedure: EGD, WITH CLOSED BIOPSY;  Surgeon: Lauri Gupta MD;  Location: [facility] ENDOSCOPY;  Service: Endoscopy;  Laterality: N/A;     Family History   Problem Relation Name Age of Onset    Breast cancer Sister       Social History[1]  Review of Systems   Constitutional:  Negative for fatigue, fever and unexpected weight change.    HENT:  Positive for congestion and nosebleeds. Negative for rhinorrhea.    Eyes:  Negative for pain.   Respiratory:  Negative for chest tightness, shortness of breath and wheezing.    Cardiovascular:  Negative for chest pain.   Gastrointestinal:  Positive for abdominal distention and abdominal pain. Negative for constipation, diarrhea, nausea and vomiting.   Genitourinary:  Negative for dysuria.   Musculoskeletal:  Negative for back pain and neck pain.   Skin:  Negative for rash.   Neurological:  Negative for dizziness and speech difficulty.   Hematological:  Does not bruise/bleed easily.   Psychiatric/Behavioral:  Negative for sleep disturbance and suicidal ideas.        Physical Exam     Initial Vitals [07/25/25 0904]   BP Pulse Resp Temp SpO2   (!) 174/78 (!) 56 18 97.9 °F (36.6 °C) 98 %      MAP       --         Physical Exam    Nursing note and vitals reviewed.  Constitutional: He appears well-developed and well-nourished. No distress.   HENT:   Head: Normocephalic and atraumatic.   Nasal congestion   Eyes: EOM are normal. Pupils are equal, round, and reactive to light.   Neck: Trachea normal.   Normal range of motion.  Cardiovascular:  Normal rate, regular rhythm, normal heart sounds and intact distal pulses.           No murmur heard.  Pulmonary/Chest: Breath sounds normal. No respiratory distress.   Abdominal: Abdomen is soft. Bowel sounds are normal. He exhibits no distension. There is abdominal tenderness (global).   Abdominal distention and ascites   Musculoskeletal:         General: Normal range of motion.      Cervical back: Normal range of motion.      Lumbar back: Normal.     Neurological: He is alert and oriented to person, place, and time. He has normal strength. No cranial nerve deficit. GCS score is 15. GCS eye subscore is 4. GCS verbal subscore is 5. GCS motor subscore is 6.   Skin: Skin is warm and dry. Capillary refill takes less than 2 seconds. No rash noted.   Psychiatric: He has a normal  mood and affect. Judgment normal.         ED Course   Procedures  Labs Reviewed   COMPREHENSIVE METABOLIC PANEL - Abnormal       Result Value    Sodium 143      Potassium 3.3 (*)     Chloride 107      CO2 27      Glucose 87      Blood Urea Nitrogen 14.3      Creatinine 1.57 (*)     Calcium 9.6      Protein Total 7.0      Albumin 3.9      Globulin 3.1      Albumin/Globulin Ratio 1.3      Bilirubin Total 0.9      ALP 57      ALT 20      AST 17      eGFR 46      Anion Gap 9.0      BUN/Creatinine Ratio 9     TROPONIN I HIGH SENSITIVITY - Abnormal    Troponin High Sensitive 89 (*)    URINALYSIS, REFLEX TO URINE CULTURE - Abnormal    Color, UA Colorless      Appearance, UA Clear      Specific Gravity, UA 1.019      pH, UA 6.0      Protein, UA Negative      Glucose, UA Normal      Ketones, UA Negative      Blood, UA Negative      Bilirubin, UA Negative      Urobilinogen, UA Normal      Nitrites, UA Negative      Leukocyte Esterase, UA Negative      RBC, UA 0-5      WBC, UA 0-5      Bacteria, UA None Seen      Squamous Epithelial Cells, UA Trace      Hyaline Casts, UA 0-2 (*)    CBC WITH DIFFERENTIAL - Abnormal    WBC 10.59      RBC 4.70      Hgb 13.3 (*)     Hct 41.0 (*)     MCV 87.2      MCH 28.3      MCHC 32.4 (*)     RDW 15.7      Platelet 392      MPV 10.1      Neut % 57.1      Lymph % 29.5      Mono % 9.2      Eos % 2.9      Basophil % 0.5      Imm Grans % 0.8      Neut # 6.06      Lymph # 3.12      Mono # 0.97      Eos # 0.31      Baso # 0.05      Imm Gran # 0.08 (*)     NRBC% 0.0     TROPONIN I HIGH SENSITIVITY - Abnormal    Troponin High Sensitive 82 (*)    NT-PRO NATRIURETIC PEPTIDE - Normal    NT-proBNP 265      Narrative:     NOTE:  Access complete set of age - and/or gender-specific reference intervals for this test in the [facility] Collection Manual.   MAGNESIUM - Normal    Magnesium Level 2.20     CBC W/ AUTO DIFFERENTIAL    Narrative:     The following orders were created for panel order CBC auto  differential.  Procedure                               Abnormality         Status                     ---------                               -----------         ------                     CBC with Differential[[id number]]       Abnormal            Final result                 Please view results for these tests on the individual orders.     EKG Readings: (Independently Interpreted)   Initial Reading: No STEMI. Rhythm: Sinus Bradycardia. Heart Rate: 56. Ectopy: No Ectopy. ST Segments: Normal ST Segments. T Waves: Normal. Clinical Impression: Sinus Bradycardia   Performed at 0909     ECG Results              EKG 12-lead (Final result)        Collection Time Result Time QRS Duration [facility] QTC Calculation    07/25/25 09:09:02 07/25/25 15:57:15 106 414                     Final result by Interface, Lab In [facility] (07/25/25 15:57:22)                   Narrative:    Test Reason : R10.9,    Vent. Rate :  56 BPM     Atrial Rate :  56 BPM     P-R Int : 224 ms          QRS Dur : 106 ms      QT Int : 430 ms       P-R-T Axes :  78 -32 125 degrees    QTcB Int : 414 ms    Sinus bradycardia with 1st degree A-V block  Left axis deviation  Septal infarct (cited on or before 13-Mar-2025)  T wave abnormality, consider lateral ischemia  Abnormal ECG  When compared with ECG of 13-Mar-2025 16:05,  Questionable change in initial forces of Septal leads  Nonspecific T wave abnormality no longer evident in Inferior leads  Confirmed by Lauri Lerma ([id number]) on 7/25/2025 3:57:13 PM    Referred By:            Confirmed By: Lauri Lerma                                     EKG 12-lead (Final result)  Result time 07/30/25 08:48:10      Final result by Unknown User (07/30/25 08:48:10)                                      Imaging Results              CT Abdomen Pelvis With IV Contrast NO Oral Contrast (Final result)  Result time 07/25/25 11:35:59      Final result by Bita Benitez MD (07/25/25 11:35:59)                   Impression:       Stable enhancing exophytic duodenal mass    Stable hemangioma in the liver    Prostatic hypertrophy      Electronically signed by: Donald Benitez  Date:    07/25/2025  Time:    11:35               Narrative:    EXAMINATION:  CT ABDOMEN PELVIS WITH IV CONTRAST    CLINICAL HISTORY:  Abdominal pain, acute, nonlocalized;hx duodenal mass;    TECHNIQUE:  Low dose axial images, sagittal and coronal reformations were obtained from the lung bases to the pubic symphysis following the IV administration of contrast. Automatic exposure control (AEC) is utilized to reduce patient radiation exposure.    COMPARISON:  03/01/2025    FINDINGS:  The lung bases are clear.    The liver appears normal in size.  There is a hemangioma in the inferior margin of the liver in segment 5..  Portal and hepatic veins appear normal.    The gallbladder appears normal.  No obvious gallstones are seen.  No biliary dilatation is seen.  No pericholecystic fluid is seen.    The pancreas appears normal.  No pancreatic mass or lesion is seen.    The spleen shows no acute abnormality.    The adrenal glands appear normal.  No adrenal nodule is seen.    The kidneys appear normal.  No hydronephrosis is seen.  No hydroureter is seen.  No nephrolithiasis is seen.  No obvious ureteral stones are seen.    Urinary bladder appears grossly unremarkable.  The prostate is enlarged in size.    There is an exophytic solid enhancing mass in the 3rd portion of the duodenum the measuring 2.4 x 1.9 cm.  This was seen on the prior examination as well and appears stable.  No colitis is seen.  No diverticulitis is seen.  No obvious colonic mass or lesion is seen.    No free air is seen.  No free fluid is seen.                                       Medications   iohexoL (OMNIPAQUE 350) injection 100 mL (100 mLs Intravenous Given 7/25/25 1112)   pantoprazole EC tablet 40 mg (40 mg Oral Given 7/25/25 1511)     Medical Decision Making  The differential diagnosis includes, but is  not limited to, flu, covid, RSV, ascites, liver failure, third spacing     Amount and/or Complexity of Data Reviewed  Radiology: ordered.  ECG/medicine tests: ordered.     Details: Initial Reading: No STEMI. Rhythm: Sinus Bradycardia. Heart Rate: 56. Ectopy: No Ectopy. ST Segments: Normal ST Segments. T Waves: Normal. Clinical Impression: Sinus Bradycardia   Performed at 0909    Risk  Prescription drug management.            Scribe Attestation:   Scribe #1: I performed the above scribed service and the documentation accurately describes the services I performed. I attest to the accuracy of the note.    Attending Attestation:           Physician Attestation for Scribe:  Physician Attestation Statement for Scribe #1: I, Josiah Murry III, MD, reviewed documentation, as scribed by Susana Mayen in my presence, and it is both accurate and complete.             ED Course as of 08/04/25 1501   Fri Jul 25, 2025   1258 CAT scan with stable mass will repeat troponin [FK]   1511 Troponin trending down will referred for evaluation of this mass follow up primary care GI and Surgical Oncology [FK]      ED Course User Index  [FK] Josiah Murry III, MD                               Clinical Impression:  Final diagnoses:  [R10.9] Abdominal pain  [K29.70] Gastritis, presence of bleeding unspecified, unspecified chronicity, unspecified gastritis type (Primary)          ED Disposition Condition    Discharge Stable          ED Prescriptions       Medication Sig Dispense Start Date End Date Auth. Provider    pantoprazole (PROTONIX) 40 MG tablet Take 1 tablet (40 mg total) by mouth once daily. 30 tablet 7/25/2025 -- Josiah Murry III, MD          Follow-up Information       Follow up With Specialties Details Why Contact Info    Marleny Llamas NP Internal Medicine   [address] [address]  [phone]      Dav Maravilla MD Surgical Oncology In 1 week  [address]  [address]  [facility]  [address]  [phone]      Lauri Gupta MD Gastroenterology In 1 week  [address]  [facility] [address]  [phone]                     [1]   Social History  Tobacco Use    Smoking status: Every Day     Current packs/day: 0.05     Average packs/day: 0.1 packs/day for 50.0 years (2.5 ttl pk-yrs)     Types: Cigarettes    Smokeless tobacco: Never   Substance Use Topics    Alcohol use: Not Currently    Drug use: Not Currently        Josiah Murry III, MD  08/04/25 1508

## 2025-07-31 PROBLEM — Z76.89 ENCOUNTER TO ESTABLISH CARE WITH NEW DOCTOR: Status: ACTIVE | Noted: 2025-07-31

## 2025-07-31 PROBLEM — I25.10 CORONARY ARTERY DISEASE INVOLVING NATIVE CORONARY ARTERY OF NATIVE HEART WITHOUT ANGINA PECTORIS: Status: ACTIVE | Noted: 2025-07-31

## 2025-08-04 PROBLEM — Z76.89 ENCOUNTER TO ESTABLISH CARE WITH NEW DOCTOR: Status: RESOLVED | Noted: 2025-07-31 | Resolved: 2025-08-04

## (undated) DEVICE — KIT CANIST SUCTION 1200CC

## (undated) DEVICE — SOL IRRI STRL WATER 1000ML

## (undated) DEVICE — BLOCK BLOX BITE DENT RIM 54FR

## (undated) DEVICE — BAG LABGUARD BIOHAZARD 6X9IN

## (undated) DEVICE — COLLECTION SPECIMEN NEPTUNE

## (undated) DEVICE — TIP SUCTION YANKAUER

## (undated) DEVICE — CONTAINER SPECIMEN SCREW 4OZ

## (undated) DEVICE — TUBING O2 FEMALE CONN 13FT

## (undated) DEVICE — KIT SURGICAL COLON .25 1.1OZ